# Patient Record
Sex: FEMALE | Race: WHITE | Employment: FULL TIME | URBAN - METROPOLITAN AREA
[De-identification: names, ages, dates, MRNs, and addresses within clinical notes are randomized per-mention and may not be internally consistent; named-entity substitution may affect disease eponyms.]

---

## 2017-01-20 ENCOUNTER — TELEPHONE (OUTPATIENT)
Dept: FAMILY MEDICINE CLINIC | Age: 57
End: 2017-01-20

## 2017-01-25 ENCOUNTER — TELEPHONE (OUTPATIENT)
Dept: FAMILY MEDICINE CLINIC | Age: 57
End: 2017-01-25

## 2017-01-31 ENCOUNTER — OFFICE VISIT (OUTPATIENT)
Age: 57
End: 2017-01-31

## 2017-01-31 ENCOUNTER — HOSPITAL ENCOUNTER (OUTPATIENT)
Dept: GENERAL RADIOLOGY | Age: 57
Discharge: HOME | End: 2017-01-31
Attending: INTERNAL MEDICINE | Admitting: INTERNAL MEDICINE

## 2017-01-31 VITALS
SYSTOLIC BLOOD PRESSURE: 120 MMHG | DIASTOLIC BLOOD PRESSURE: 58 MMHG | WEIGHT: 119 LBS | BODY MASS INDEX: 18.04 KG/M2 | HEIGHT: 68 IN

## 2017-01-31 DIAGNOSIS — E83.119 HEMOCHROMATOSIS, UNSPECIFIED HEMOCHROMATOSIS TYPE: ICD-10-CM

## 2017-01-31 DIAGNOSIS — M81.0 OSTEOPOROSIS, POSTMENOPAUSAL: ICD-10-CM

## 2017-01-31 DIAGNOSIS — M81.0 OSTEOPOROSIS, POSTMENOPAUSAL: Primary | ICD-10-CM

## 2017-01-31 DIAGNOSIS — Z51.81 MEDICATION MONITORING ENCOUNTER: ICD-10-CM

## 2017-01-31 DIAGNOSIS — R82.994 HYPERCALCIURIA: ICD-10-CM

## 2017-01-31 PROCEDURE — 77080 DXA BONE DENSITY AXIAL: CPT | Performed by: INTERNAL MEDICINE

## 2017-01-31 PROCEDURE — 99214 OFFICE O/P EST MOD 30 MIN: CPT | Performed by: INTERNAL MEDICINE

## 2017-01-31 RX ORDER — HYDROCHLOROTHIAZIDE 25 MG/1
TABLET ORAL
Qty: 90 TABLET | Refills: 4 | Status: SHIPPED | OUTPATIENT
Start: 2017-01-31 | End: 2017-08-04 | Stop reason: SDUPTHER

## 2017-01-31 RX ORDER — POTASSIUM CITRATE 5 MEQ/1
5 TABLET, EXTENDED RELEASE ORAL DAILY
COMMUNITY
End: 2019-07-09

## 2017-02-01 ENCOUNTER — PROCEDURE VISIT (OUTPATIENT)
Age: 57
End: 2017-02-01

## 2017-02-01 ENCOUNTER — OFFICE VISIT (OUTPATIENT)
Dept: FAMILY MEDICINE CLINIC | Age: 57
End: 2017-02-01

## 2017-02-01 VITALS
WEIGHT: 120 LBS | HEART RATE: 76 BPM | BODY MASS INDEX: 18.27 KG/M2 | DIASTOLIC BLOOD PRESSURE: 82 MMHG | SYSTOLIC BLOOD PRESSURE: 122 MMHG | OXYGEN SATURATION: 99 %

## 2017-02-01 DIAGNOSIS — M81.0 OSTEOPOROSIS, POSTMENOPAUSAL: Primary | ICD-10-CM

## 2017-02-01 DIAGNOSIS — M81.0 OSTEOPOROSIS, POSTMENOPAUSAL: ICD-10-CM

## 2017-02-01 DIAGNOSIS — I10 ESSENTIAL HYPERTENSION: ICD-10-CM

## 2017-02-01 DIAGNOSIS — Z76.89 ENCOUNTER TO ESTABLISH CARE: Primary | ICD-10-CM

## 2017-02-01 DIAGNOSIS — E83.119 HEMOCHROMATOSIS, UNSPECIFIED HEMOCHROMATOSIS TYPE: ICD-10-CM

## 2017-02-01 PROCEDURE — 99202 OFFICE O/P NEW SF 15 MIN: CPT | Performed by: FAMILY MEDICINE

## 2017-02-01 RX ORDER — MULTIVITAMIN WITH IRON
250 TABLET ORAL DAILY
COMMUNITY
End: 2020-08-03

## 2017-02-01 ASSESSMENT — PATIENT HEALTH QUESTIONNAIRE - PHQ9
SUM OF ALL RESPONSES TO PHQ9 QUESTIONS 1 & 2: 0
SUM OF ALL RESPONSES TO PHQ QUESTIONS 1-9: 0
1. LITTLE INTEREST OR PLEASURE IN DOING THINGS: 0
2. FEELING DOWN, DEPRESSED OR HOPELESS: 0

## 2017-03-06 ENCOUNTER — EMPLOYEE WELLNESS (OUTPATIENT)
Dept: OTHER | Age: 57
End: 2017-03-06

## 2017-03-09 ENCOUNTER — HOSPITAL ENCOUNTER (OUTPATIENT)
Dept: OTHER | Age: 57
Discharge: HOME | End: 2017-03-09
Attending: INTERNAL MEDICINE | Admitting: INTERNAL MEDICINE

## 2017-03-09 LAB
A/G RATIO: 1.9 (ref 1.1–2.2)
ALBUMIN SERPL-MCNC: 4.9 G/DL (ref 3.4–5)
ALP BLD-CCNC: 73 U/L (ref 40–129)
ALT SERPL-CCNC: 15 U/L (ref 10–40)
ANION GAP SERPL CALCULATED.3IONS-SCNC: 15 MMOL/L (ref 3–16)
AST SERPL-CCNC: 20 U/L (ref 15–37)
BASOPHILS ABSOLUTE: 0 K/UL (ref 0–0.2)
BASOPHILS RELATIVE PERCENT: 0.7 %
BILIRUB SERPL-MCNC: 0.4 MG/DL (ref 0–1)
BUN BLDV-MCNC: 14 MG/DL (ref 7–20)
CALCIUM SERPL-MCNC: 9.5 MG/DL (ref 8.3–10.6)
CHLORIDE BLD-SCNC: 95 MMOL/L (ref 99–110)
CO2: 28 MMOL/L (ref 21–32)
CREAT SERPL-MCNC: 0.6 MG/DL (ref 0.6–1.1)
EOSINOPHILS ABSOLUTE: 0.1 K/UL (ref 0–0.6)
EOSINOPHILS RELATIVE PERCENT: 1.3 %
FERRITIN: 18.5 NG/ML (ref 15–150)
GFR AFRICAN AMERICAN: >60
GFR NON-AFRICAN AMERICAN: >60
GLOBULIN: 2.6 G/DL
GLUCOSE BLD-MCNC: 55 MG/DL (ref 70–99)
HCT VFR BLD CALC: 39.9 % (ref 36–48)
HEMOGLOBIN: 13.4 G/DL (ref 12–16)
LYMPHOCYTES ABSOLUTE: 1.7 K/UL (ref 1–5.1)
LYMPHOCYTES RELATIVE PERCENT: 44.9 %
MCH RBC QN AUTO: 34.9 PG (ref 26–34)
MCHC RBC AUTO-ENTMCNC: 33.6 G/DL (ref 31–36)
MCV RBC AUTO: 103.7 FL (ref 80–100)
MONOCYTES ABSOLUTE: 0.5 K/UL (ref 0–1.3)
MONOCYTES RELATIVE PERCENT: 12.7 %
NEUTROPHILS ABSOLUTE: 1.5 K/UL (ref 1.7–7.7)
NEUTROPHILS RELATIVE PERCENT: 40.4 %
PDW BLD-RTO: 12.2 % (ref 12.4–15.4)
PLATELET # BLD: 189 K/UL (ref 135–450)
PMV BLD AUTO: 8.2 FL (ref 5–10.5)
POTASSIUM SERPL-SCNC: 3.4 MMOL/L (ref 3.5–5.1)
RBC # BLD: 3.84 M/UL (ref 4–5.2)
SODIUM BLD-SCNC: 138 MMOL/L (ref 136–145)
TOTAL PROTEIN: 7.5 G/DL (ref 6.4–8.2)
WBC # BLD: 3.8 K/UL (ref 4–11)

## 2017-03-10 LAB — AFP-TUMOR MARKER: 2 NG/ML (ref 0–9)

## 2017-07-20 ENCOUNTER — HOSPITAL ENCOUNTER (OUTPATIENT)
Dept: ENDOSCOPY | Age: 57
Discharge: HOME | End: 2017-07-20
Attending: INTERNAL MEDICINE | Admitting: INTERNAL MEDICINE

## 2017-07-20 VITALS
SYSTOLIC BLOOD PRESSURE: 126 MMHG | WEIGHT: 117 LBS | BODY MASS INDEX: 17.73 KG/M2 | DIASTOLIC BLOOD PRESSURE: 64 MMHG | HEART RATE: 80 BPM | HEIGHT: 68 IN | TEMPERATURE: 97.7 F | OXYGEN SATURATION: 100 %

## 2017-08-04 ENCOUNTER — TELEPHONE (OUTPATIENT)
Dept: ENDOCRINOLOGY | Age: 57
End: 2017-08-04

## 2017-08-04 RX ORDER — HYDROCHLOROTHIAZIDE 25 MG/1
TABLET ORAL
Qty: 90 TABLET | Refills: 1 | Status: SHIPPED | OUTPATIENT
Start: 2017-08-04 | End: 2018-02-05 | Stop reason: SDUPTHER

## 2017-08-07 ENCOUNTER — OFFICE VISIT (OUTPATIENT)
Dept: DERMATOLOGY | Age: 57
End: 2017-08-07

## 2017-08-07 DIAGNOSIS — L71.9 ROSACEA: ICD-10-CM

## 2017-08-07 DIAGNOSIS — Z12.83 SCREENING EXAM FOR SKIN CANCER: ICD-10-CM

## 2017-08-07 DIAGNOSIS — B35.1 ONYCHOMYCOSIS OF TOENAIL: ICD-10-CM

## 2017-08-07 DIAGNOSIS — D22.9 MULTIPLE BENIGN NEVI: Primary | ICD-10-CM

## 2017-08-07 PROCEDURE — 99203 OFFICE O/P NEW LOW 30 MIN: CPT | Performed by: DERMATOLOGY

## 2017-09-11 ENCOUNTER — HOSPITAL ENCOUNTER (OUTPATIENT)
Dept: OTHER | Age: 57
Discharge: HOME | End: 2017-09-11
Attending: INTERNAL MEDICINE | Admitting: INTERNAL MEDICINE

## 2017-09-11 LAB
A/G RATIO: 1.8 (ref 1.1–2.2)
ALBUMIN SERPL-MCNC: 4.8 G/DL (ref 3.4–5)
ALP BLD-CCNC: 69 U/L (ref 40–129)
ALT SERPL-CCNC: 15 U/L (ref 10–40)
ANION GAP SERPL CALCULATED.3IONS-SCNC: 14 MMOL/L (ref 3–16)
AST SERPL-CCNC: 19 U/L (ref 15–37)
BASOPHILS ABSOLUTE: 0 K/UL (ref 0–0.2)
BASOPHILS RELATIVE PERCENT: 1.2 %
BILIRUB SERPL-MCNC: 0.4 MG/DL (ref 0–1)
BUN BLDV-MCNC: 27 MG/DL (ref 7–20)
CALCIUM SERPL-MCNC: 9.9 MG/DL (ref 8.3–10.6)
CHLORIDE BLD-SCNC: 104 MMOL/L (ref 99–110)
CO2: 28 MMOL/L (ref 21–32)
CREAT SERPL-MCNC: <0.5 MG/DL (ref 0.6–1.1)
EOSINOPHILS ABSOLUTE: 0.1 K/UL (ref 0–0.6)
EOSINOPHILS RELATIVE PERCENT: 1.8 %
GFR AFRICAN AMERICAN: >60
GFR NON-AFRICAN AMERICAN: >60
GLOBULIN: 2.6 G/DL
GLUCOSE BLD-MCNC: 65 MG/DL (ref 70–99)
HCT VFR BLD CALC: 39.8 % (ref 36–48)
HEMOGLOBIN: 13.7 G/DL (ref 12–16)
INR BLD: 0.93 (ref 0.85–1.15)
LYMPHOCYTES ABSOLUTE: 0.9 K/UL (ref 1–5.1)
LYMPHOCYTES RELATIVE PERCENT: 31.8 %
MCH RBC QN AUTO: 35.3 PG (ref 26–34)
MCHC RBC AUTO-ENTMCNC: 34.4 G/DL (ref 31–36)
MCV RBC AUTO: 102.6 FL (ref 80–100)
MONOCYTES ABSOLUTE: 0.3 K/UL (ref 0–1.3)
MONOCYTES RELATIVE PERCENT: 9.1 %
NEUTROPHILS ABSOLUTE: 1.7 K/UL (ref 1.7–7.7)
NEUTROPHILS RELATIVE PERCENT: 56.1 %
PDW BLD-RTO: 12.7 % (ref 12.4–15.4)
PLATELET # BLD: 209 K/UL (ref 135–450)
PMV BLD AUTO: 8 FL (ref 5–10.5)
POTASSIUM SERPL-SCNC: 4.1 MMOL/L (ref 3.5–5.1)
PROTHROMBIN TIME: 10.5 SEC (ref 9.6–13)
RBC # BLD: 3.87 M/UL (ref 4–5.2)
SODIUM BLD-SCNC: 146 MMOL/L (ref 136–145)
TOTAL PROTEIN: 7.4 G/DL (ref 6.4–8.2)
WBC # BLD: 2.9 K/UL (ref 4–11)

## 2017-09-12 LAB — VITAMIN D 1,25-DIHYDROXY: 50.3 PG/ML (ref 19.9–79.3)

## 2017-09-13 LAB — AFP: 0.9 UG/L

## 2017-12-26 ENCOUNTER — HOSPITAL ENCOUNTER (OUTPATIENT)
Dept: MAMMOGRAPHY | Age: 57
Discharge: HOME | End: 2017-12-26
Attending: SPECIALIST | Admitting: SPECIALIST

## 2017-12-26 DIAGNOSIS — Z12.39 BREAST CANCER SCREENING: ICD-10-CM

## 2018-03-08 ENCOUNTER — EMPLOYEE WELLNESS (OUTPATIENT)
Dept: OTHER | Age: 58
End: 2018-03-08

## 2018-03-08 LAB
CHOLESTEROL, TOTAL: 185 MG/DL (ref 0–199)
GLUCOSE BLD-MCNC: 88 MG/DL (ref 70–99)
HDLC SERPL-MCNC: 98 MG/DL (ref 40–60)
LDL CHOLESTEROL CALCULATED: 75 MG/DL
TRIGL SERPL-MCNC: 59 MG/DL (ref 0–150)

## 2018-03-20 VITALS — WEIGHT: 120 LBS | BODY MASS INDEX: 18.27 KG/M2

## 2018-03-26 ENCOUNTER — TELEPHONE (OUTPATIENT)
Age: 58
End: 2018-03-26

## 2018-04-02 VITALS — WEIGHT: 115 LBS | BODY MASS INDEX: 17.49 KG/M2

## 2018-05-25 ENCOUNTER — HOSPITAL ENCOUNTER (OUTPATIENT)
Dept: GENERAL RADIOLOGY | Age: 58
Discharge: HOME | End: 2018-05-25
Attending: INTERNAL MEDICINE | Admitting: INTERNAL MEDICINE

## 2018-05-25 DIAGNOSIS — M81.0 AGE-RELATED OSTEOPOROSIS WITHOUT CURRENT PATHOLOGICAL FRACTURE: ICD-10-CM

## 2018-05-25 DIAGNOSIS — M81.0 OSTEOPOROSIS, POSTMENOPAUSAL: Chronic | ICD-10-CM

## 2018-05-25 PROCEDURE — 77080 DXA BONE DENSITY AXIAL: CPT | Performed by: INTERNAL MEDICINE

## 2018-05-29 ENCOUNTER — PROCEDURE VISIT (OUTPATIENT)
Age: 58
End: 2018-05-29

## 2018-05-29 DIAGNOSIS — M81.0 OSTEOPOROSIS, POSTMENOPAUSAL: Primary | Chronic | ICD-10-CM

## 2018-07-03 ENCOUNTER — OFFICE VISIT (OUTPATIENT)
Age: 58
End: 2018-07-03

## 2018-07-03 VITALS
WEIGHT: 114 LBS | DIASTOLIC BLOOD PRESSURE: 62 MMHG | SYSTOLIC BLOOD PRESSURE: 118 MMHG | BODY MASS INDEX: 17.28 KG/M2 | HEIGHT: 68 IN

## 2018-07-03 DIAGNOSIS — M81.0 OSTEOPOROSIS, POSTMENOPAUSAL: Primary | Chronic | ICD-10-CM

## 2018-07-03 DIAGNOSIS — R82.994 HYPERCALCIURIA: Chronic | ICD-10-CM

## 2018-07-03 DIAGNOSIS — Z51.81 MEDICATION MONITORING ENCOUNTER: Chronic | ICD-10-CM

## 2018-07-03 PROCEDURE — 99214 OFFICE O/P EST MOD 30 MIN: CPT | Performed by: INTERNAL MEDICINE

## 2018-07-03 RX ORDER — HYDROCHLOROTHIAZIDE 25 MG/1
TABLET ORAL
Qty: 90 TABLET | Refills: 4 | Status: SHIPPED | OUTPATIENT
Start: 2018-07-03 | End: 2019-07-09 | Stop reason: SDUPTHER

## 2018-07-03 RX ORDER — DIMENHYDRINATE 50 MG
TABLET ORAL
COMMUNITY

## 2018-07-03 NOTE — PROGRESS NOTES
Subjective:      Patient ID: Omar Chopra is a 61 y.o. female.     HPI    Review of Systems    Objective:   Physical Exam    Assessment:            Plan:

## 2018-07-03 NOTE — PROGRESS NOTES
using arms. No apparent focal motor or sensory deficit. Coordination appears normal.     BONE DENSITY. Most recent done here using Hologic equipment. T-scores  Initial study: 10/11/2012 L1-L4 -2.2 left fem. neck -2.6   Current study: 05/25/2018 L1-L4 -2.5 left fem. neck -2.9     The table below shows bone mineral density (grams/cm2), the appropriate measure for comparing serial scans. A significant increase or decrease is based on precision studies done at our center according to the ISCD protocol with a least significant change of 0.030 g/cm2. PA spine Proximal Femur (left)   Date L1-L4 Fem. neck Trochanter Total hip   10/11/2012 0.800 0.560 0.561 ---   01/19/2016 0.768 0.524 0.525 ---   01/31/2017 0.768 0.567 0.503 0.692   05/25/2018 0.766 0.524 0.526 0.697     IMPRESSION:  BONE DENSITY IS LOW, CONSISTENT WITH OSTEOPOROSIS. BETWEEN 2017 AND 2018, BMD DID NOT CHANGE SIGNIFICANTLY IN THE SPINE, TROCHANTER OR TOTAL HIP. BMD MAY HAVE DECREASED IN THE FEMORAL NECK BUT IS SIMILAR NOW TO 2016. Labs: 11/2012, CBC, TSH, CMP. 08/2015, Ca 9.7, Cr 0.66. 02/2016 . 03/2016 Ca 9.3 Cr 0.68.  08/2016 Ca 9.4 Cr 0.5 PTH 31, 1, 25 D 62 (20-79). 09/2016 Ca 9.1 Cr 0.7 K 3.4. 09/2017 Ca 9.9 Cr 0.5. X-rays viewed: DXA printouts reviewed. 12/2013 abd CT.    ASSESSMENT. Osteoporosis, bone density lower than desirable. BMD decreased substantially 9454-5936 but was stable/increased 3366-3600. Using FRAX, 10-year risk is below intervention thresholds now and should remain so for 5-10 years. 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 PLANS. Continue HCTZ 25 mg/d. Return appointment with DXA in 1 year. I spent 25 minutes face to face with this patient. Over 50% of that time was spent on counseling and care coordination. See assessment and plan for counseling and care coordination details. Tarun Beal MD, Director, [facility]) Osteoporosis and Bone Health Services    CC: Gisele French MD

## 2018-08-13 ENCOUNTER — OFFICE VISIT (OUTPATIENT)
Dept: DERMATOLOGY | Age: 58
End: 2018-08-13

## 2018-08-13 DIAGNOSIS — L71.9 ROSACEA: ICD-10-CM

## 2018-08-13 DIAGNOSIS — Z12.83 SCREENING EXAM FOR SKIN CANCER: ICD-10-CM

## 2018-08-13 DIAGNOSIS — D22.9 MULTIPLE BENIGN NEVI: Primary | ICD-10-CM

## 2018-08-13 PROCEDURE — 99213 OFFICE O/P EST LOW 20 MIN: CPT | Performed by: DERMATOLOGY

## 2018-08-13 NOTE — PROGRESS NOTES
[facility]) Dermatology  Karen Stern MD  [phone]      [facility]  1960    60 y.o. female     Date of Visit: 8/13/2018    Last Visit: 1yr    Chief Complaint: Skin check    History of Present Illness:  1. Here for skin/mole check. No new moles. No moles changing in size, shape, color. No moles associated w/ pain, bleeding, pruritus.   -[facility] Uses SPF 15 sunscreen (increased from SPF 4)     2. F/u PP rosacea, primarily affecting L cheek. Improved w/ prn use of metrocream (every few weeks)    Derm History:   Onychomycosis of L 1st toenail - jublia     Review of Systems:  Constitutional: Reports general sense of well-being. Skin: No interval severe sunburns. Allergies: Reviewed and updated. Past Medical History, Surgical History, Medications and Allergies reviewed. Past Medical History:   Diagnosis Date    Hypertension     Osteopenia      Past Surgical History:   Procedure Laterality Date    BONE MARROW BIOPSY      COLONOSCOPY      HEMORRHOID SURGERY      LIVER BIOPSY         No Known Allergies  Outpatient Prescriptions Marked as Taking for the 8/13/18 encounter (Office Visit) with Karen Stern MD   Medication Sig Dispense Refill    Coenzyme Q10 (CO Q-10) 100 MG CAPS Take by mouth      hydrochlorothiazide (HYDRODIURIL) 25 MG tablet TAKE 1 TABLET BY MOUTH EVERY MORNING 90 tablet 4    Efinaconazole (JUBLIA) 10 % SOLN Apply to multiple affected nails daily for 48 weeks. 8 mL 11    Biotin 2.5 MG CAPS Take by mouth      metroNIDAZOLE (METROCREAM) 0.75 % cream Apply to face bid.  45 g 3    magnesium (MAGNESIUM-OXIDE) 250 MG TABS tablet Take 250 mg by mouth daily      potassium citrate (UROCIT-K) 5 MEQ (540 MG) extended release tablet Take 5 mEq by mouth daily      Calcium Carbonate-Vit D-Min (CALTRATE PLUS PO) Take by mouth      Cholecalciferol (VITAMIN D) 2000 UNITS CAPS capsule Take by mouth      folic acid (FOLVITE) 594 MCG tablet Take 800 mcg by mouth

## 2018-09-05 ENCOUNTER — HOSPITAL ENCOUNTER (OUTPATIENT)
Dept: OTHER | Age: 58
Discharge: HOME | End: 2018-09-05
Attending: INTERNAL MEDICINE | Admitting: INTERNAL MEDICINE

## 2018-09-05 ENCOUNTER — HOSPITAL ENCOUNTER (OUTPATIENT)
Dept: OTHER | Age: 58
Discharge: HOME | End: 2018-09-05

## 2018-09-05 LAB
ALBUMIN SERPL-MCNC: 4.5 G/DL (ref 3.4–5)
ALBUMIN SERPL-MCNC: 4.5 G/DL (ref 3.4–5)
ALP BLD-CCNC: 71 U/L (ref 40–129)
ALT SERPL-CCNC: 16 U/L (ref 10–40)
ANION GAP SERPL CALCULATED.3IONS-SCNC: 13 MMOL/L (ref 3–16)
AST SERPL-CCNC: 23 U/L (ref 15–37)
BASOPHILS ABSOLUTE: 0 K/UL (ref 0–0.2)
BASOPHILS RELATIVE PERCENT: 1 %
BILIRUB SERPL-MCNC: <0.2 MG/DL (ref 0–1)
BILIRUBIN DIRECT: <0.2 MG/DL (ref 0–0.3)
BILIRUBIN, INDIRECT: NORMAL MG/DL (ref 0–1)
BUN BLDV-MCNC: 18 MG/DL (ref 7–20)
CALCIUM SERPL-MCNC: 9.5 MG/DL (ref 8.3–10.6)
CHLORIDE BLD-SCNC: 101 MMOL/L (ref 99–110)
CO2: 28 MMOL/L (ref 21–32)
CREAT SERPL-MCNC: 0.6 MG/DL (ref 0.6–1.1)
EOSINOPHILS ABSOLUTE: 0.1 K/UL (ref 0–0.6)
EOSINOPHILS RELATIVE PERCENT: 1.8 %
FERRITIN: 16.6 NG/ML (ref 15–150)
GFR AFRICAN AMERICAN: >60
GFR NON-AFRICAN AMERICAN: >60
GLUCOSE BLD-MCNC: 84 MG/DL (ref 70–99)
HCT VFR BLD CALC: 36.6 % (ref 36–48)
HEMOGLOBIN: 12.4 G/DL (ref 12–16)
LYMPHOCYTES ABSOLUTE: 0.9 K/UL (ref 1–5.1)
LYMPHOCYTES RELATIVE PERCENT: 27.2 %
MCH RBC QN AUTO: 34.1 PG (ref 26–34)
MCHC RBC AUTO-ENTMCNC: 33.9 G/DL (ref 31–36)
MCV RBC AUTO: 100.5 FL (ref 80–100)
MONOCYTES ABSOLUTE: 0.3 K/UL (ref 0–1.3)
MONOCYTES RELATIVE PERCENT: 9.6 %
NEUTROPHILS ABSOLUTE: 2 K/UL (ref 1.7–7.7)
NEUTROPHILS RELATIVE PERCENT: 60.4 %
PDW BLD-RTO: 14.6 % (ref 12.4–15.4)
PHOSPHORUS: 5.5 MG/DL (ref 2.5–4.9)
PLATELET # BLD: 193 K/UL (ref 135–450)
PMV BLD AUTO: 8.3 FL (ref 5–10.5)
POTASSIUM SERPL-SCNC: 4.2 MMOL/L (ref 3.5–5.1)
RBC # BLD: 3.64 M/UL (ref 4–5.2)
SODIUM BLD-SCNC: 142 MMOL/L (ref 136–145)
TOTAL PROTEIN: 6.6 G/DL (ref 6.4–8.2)
WBC # BLD: 3.3 K/UL (ref 4–11)

## 2018-12-31 ENCOUNTER — HOSPITAL ENCOUNTER (OUTPATIENT)
Dept: WOMENS IMAGING | Age: 58
Discharge: HOME | End: 2018-12-31
Payer: COMMERCIAL

## 2018-12-31 DIAGNOSIS — Z12.31 ENCOUNTER FOR SCREENING MAMMOGRAM FOR BREAST CANCER: ICD-10-CM

## 2018-12-31 PROCEDURE — 77063 BREAST TOMOSYNTHESIS BI: CPT

## 2019-03-18 ENCOUNTER — HOSPITAL ENCOUNTER (OUTPATIENT)
Age: 59
Discharge: HOME | End: 2019-03-18
Payer: COMMERCIAL

## 2019-03-18 LAB
ALBUMIN SERPL-MCNC: 4.8 G/DL (ref 3.4–5)
ALP BLD-CCNC: 65 U/L (ref 40–129)
ALT SERPL-CCNC: 13 U/L (ref 10–40)
ANION GAP SERPL CALCULATED.3IONS-SCNC: 14 MMOL/L (ref 3–16)
AST SERPL-CCNC: 18 U/L (ref 15–37)
BASOPHILS ABSOLUTE: 0 K/UL (ref 0–0.2)
BASOPHILS RELATIVE PERCENT: 1 %
BILIRUB SERPL-MCNC: 0.5 MG/DL (ref 0–1)
BILIRUBIN DIRECT: <0.2 MG/DL (ref 0–0.3)
BILIRUBIN URINE: NEGATIVE
BILIRUBIN, INDIRECT: NORMAL MG/DL (ref 0–1)
BLOOD, URINE: NEGATIVE
BUN BLDV-MCNC: 21 MG/DL (ref 7–20)
CALCIUM SERPL-MCNC: 9.7 MG/DL (ref 8.3–10.6)
CHLORIDE BLD-SCNC: 99 MMOL/L (ref 99–110)
CHOLESTEROL, TOTAL: 197 MG/DL (ref 0–199)
CLARITY: CLEAR
CO2: 30 MMOL/L (ref 21–32)
COLOR: YELLOW
CREAT SERPL-MCNC: 0.6 MG/DL (ref 0.6–1.1)
EOSINOPHILS ABSOLUTE: 0 K/UL (ref 0–0.6)
EOSINOPHILS RELATIVE PERCENT: 1 %
FERRITIN: 25.3 NG/ML (ref 15–150)
GFR AFRICAN AMERICAN: >60
GFR NON-AFRICAN AMERICAN: >60
GLUCOSE BLD-MCNC: 90 MG/DL (ref 70–99)
GLUCOSE URINE: NEGATIVE MG/DL
HCT VFR BLD CALC: 35.7 % (ref 36–48)
HDLC SERPL-MCNC: 84 MG/DL (ref 40–60)
HEMOGLOBIN: 12.1 G/DL (ref 12–16)
IRON SATURATION: 61 % (ref 15–50)
IRON: 182 UG/DL (ref 37–145)
KETONES, URINE: NEGATIVE MG/DL
LDL CHOLESTEROL CALCULATED: 102 MG/DL
LEUKOCYTE ESTERASE, URINE: NEGATIVE
LYMPHOCYTES ABSOLUTE: 0.5 K/UL (ref 1–5.1)
LYMPHOCYTES RELATIVE PERCENT: 15 %
MACROCYTES: ABNORMAL
MCH RBC QN AUTO: 34 PG (ref 26–34)
MCHC RBC AUTO-ENTMCNC: 33.9 G/DL (ref 31–36)
MCV RBC AUTO: 100.2 FL (ref 80–100)
MICROSCOPIC EXAMINATION: NORMAL
MONOCYTES ABSOLUTE: 0.3 K/UL (ref 0–1.3)
MONOCYTES RELATIVE PERCENT: 10 %
NEUTROPHILS ABSOLUTE: 2.4 K/UL (ref 1.7–7.7)
NEUTROPHILS RELATIVE PERCENT: 73 %
NITRITE, URINE: NEGATIVE
PDW BLD-RTO: 12.3 % (ref 12.4–15.4)
PH UA: 7 (ref 5–8)
PHOSPHORUS: 4.4 MG/DL (ref 2.5–4.9)
PLATELET # BLD: 312 K/UL (ref 135–450)
PLATELET SLIDE REVIEW: ADEQUATE
PMV BLD AUTO: 7.6 FL (ref 5–10.5)
POTASSIUM SERPL-SCNC: 4.2 MMOL/L (ref 3.5–5.1)
PROTEIN UA: NEGATIVE MG/DL
RBC # BLD: 3.56 M/UL (ref 4–5.2)
RBC # BLD: NORMAL 10*6/UL
SLIDE REVIEW: ABNORMAL
SODIUM BLD-SCNC: 143 MMOL/L (ref 136–145)
SPECIFIC GRAVITY UA: 1.01 (ref 1–1.03)
TOTAL IRON BINDING CAPACITY: 297 UG/DL (ref 260–445)
TOTAL PROTEIN: 7.4 G/DL (ref 6.4–8.2)
TRIGL SERPL-MCNC: 54 MG/DL (ref 0–150)
TSH SERPL DL<=0.05 MIU/L-ACNC: 1.45 UIU/ML (ref 0.27–4.2)
URINE REFLEX TO CULTURE: NORMAL
URINE TYPE: NORMAL
UROBILINOGEN, URINE: 0.2 E.U./DL
VITAMIN D 25-HYDROXY: 60.8 NG/ML
VLDLC SERPL CALC-MCNC: 11 MG/DL
WBC # BLD: 3.3 K/UL (ref 4–11)

## 2019-03-18 PROCEDURE — 82306 VITAMIN D 25 HYDROXY: CPT

## 2019-03-18 PROCEDURE — 81003 URINALYSIS AUTO W/O SCOPE: CPT

## 2019-03-18 PROCEDURE — 84443 ASSAY THYROID STIM HORMONE: CPT

## 2019-03-18 PROCEDURE — 36415 COLL VENOUS BLD VENIPUNCTURE: CPT

## 2019-03-18 PROCEDURE — 85027 COMPLETE CBC AUTOMATED: CPT

## 2019-03-18 PROCEDURE — 83540 ASSAY OF IRON: CPT

## 2019-03-18 PROCEDURE — 82728 ASSAY OF FERRITIN: CPT

## 2019-03-18 PROCEDURE — 85007 BL SMEAR W/DIFF WBC COUNT: CPT

## 2019-03-18 PROCEDURE — 80076 HEPATIC FUNCTION PANEL: CPT

## 2019-03-18 PROCEDURE — 80061 LIPID PANEL: CPT

## 2019-03-18 PROCEDURE — 83550 IRON BINDING TEST: CPT

## 2019-03-18 PROCEDURE — 84100 ASSAY OF PHOSPHORUS: CPT

## 2019-03-18 PROCEDURE — 80048 BASIC METABOLIC PNL TOTAL CA: CPT

## 2019-07-09 ENCOUNTER — PROCEDURE VISIT (OUTPATIENT)
Dept: ENDOCRINOLOGY | Age: 59
End: 2019-07-09
Payer: COMMERCIAL

## 2019-07-09 ENCOUNTER — OFFICE VISIT (OUTPATIENT)
Dept: ENDOCRINOLOGY | Age: 59
End: 2019-07-09
Payer: COMMERCIAL

## 2019-07-09 ENCOUNTER — HOSPITAL ENCOUNTER (OUTPATIENT)
Dept: GENERAL RADIOLOGY | Age: 59
Discharge: HOME | End: 2019-07-09
Payer: COMMERCIAL

## 2019-07-09 VITALS
DIASTOLIC BLOOD PRESSURE: 60 MMHG | SYSTOLIC BLOOD PRESSURE: 110 MMHG | WEIGHT: 118.6 LBS | BODY MASS INDEX: 17.98 KG/M2 | HEIGHT: 68 IN

## 2019-07-09 DIAGNOSIS — R82.994 HYPERCALCIURIA: ICD-10-CM

## 2019-07-09 DIAGNOSIS — M81.0 OSTEOPOROSIS, POSTMENOPAUSAL: Primary | ICD-10-CM

## 2019-07-09 DIAGNOSIS — M81.0 OSTEOPOROSIS, POSTMENOPAUSAL: Chronic | ICD-10-CM

## 2019-07-09 DIAGNOSIS — Z51.81 MEDICATION MONITORING ENCOUNTER: ICD-10-CM

## 2019-07-09 DIAGNOSIS — M81.0 OSTEOPOROSIS, POSTMENOPAUSAL: ICD-10-CM

## 2019-07-09 PROCEDURE — 77080 DXA BONE DENSITY AXIAL: CPT | Performed by: INTERNAL MEDICINE

## 2019-07-09 PROCEDURE — 99214 OFFICE O/P EST MOD 30 MIN: CPT | Performed by: INTERNAL MEDICINE

## 2019-07-09 PROCEDURE — 77080 DXA BONE DENSITY AXIAL: CPT

## 2019-07-09 RX ORDER — HYDROCHLOROTHIAZIDE 25 MG/1
TABLET ORAL
Qty: 90 TABLET | Refills: 4 | Status: SHIPPED | OUTPATIENT
Start: 2019-07-09 | End: 2020-03-01 | Stop reason: SDUPTHER

## 2019-07-24 ENCOUNTER — TELEPHONE (OUTPATIENT)
Dept: ENDOCRINOLOGY | Age: 59
End: 2019-07-24

## 2019-10-18 ENCOUNTER — HOSPITAL ENCOUNTER (OUTPATIENT)
Age: 59
Discharge: HOME | End: 2019-10-18
Payer: COMMERCIAL

## 2019-10-18 ENCOUNTER — HOSPITAL ENCOUNTER (OUTPATIENT)
Dept: ULTRASOUND IMAGING | Age: 59
Discharge: HOME | End: 2019-10-18
Payer: COMMERCIAL

## 2019-10-18 DIAGNOSIS — E83.119 HEMOCHROMATOSIS, UNSPECIFIED HEMOCHROMATOSIS TYPE: ICD-10-CM

## 2019-10-18 LAB
ALBUMIN SERPL-MCNC: 4.7 G/DL (ref 3.4–5)
ALP BLD-CCNC: 73 U/L (ref 40–129)
ALT SERPL-CCNC: 13 U/L (ref 10–40)
AST SERPL-CCNC: 19 U/L (ref 15–37)
BASOPHILS ABSOLUTE: 0 K/UL (ref 0–0.2)
BASOPHILS RELATIVE PERCENT: 1 %
BILIRUB SERPL-MCNC: 0.4 MG/DL (ref 0–1)
BILIRUBIN DIRECT: <0.2 MG/DL (ref 0–0.3)
BILIRUBIN, INDIRECT: NORMAL MG/DL (ref 0–1)
EOSINOPHILS ABSOLUTE: 0.1 K/UL (ref 0–0.6)
EOSINOPHILS RELATIVE PERCENT: 2.5 %
FERRITIN: 11.7 NG/ML (ref 15–150)
HCT VFR BLD CALC: 34.9 % (ref 36–48)
HEMOGLOBIN: 12 G/DL (ref 12–16)
LYMPHOCYTES ABSOLUTE: 0.8 K/UL (ref 1–5.1)
LYMPHOCYTES RELATIVE PERCENT: 27.3 %
MCH RBC QN AUTO: 32.6 PG (ref 26–34)
MCHC RBC AUTO-ENTMCNC: 34.5 G/DL (ref 31–36)
MCV RBC AUTO: 94.4 FL (ref 80–100)
MONOCYTES ABSOLUTE: 0.3 K/UL (ref 0–1.3)
MONOCYTES RELATIVE PERCENT: 9.7 %
NEUTROPHILS ABSOLUTE: 1.7 K/UL (ref 1.7–7.7)
NEUTROPHILS RELATIVE PERCENT: 59.5 %
PDW BLD-RTO: 13.4 % (ref 12.4–15.4)
PLATELET # BLD: 240 K/UL (ref 135–450)
PMV BLD AUTO: 8.2 FL (ref 5–10.5)
RBC # BLD: 3.7 M/UL (ref 4–5.2)
TOTAL PROTEIN: 7.3 G/DL (ref 6.4–8.2)
WBC # BLD: 2.9 K/UL (ref 4–11)

## 2019-10-18 PROCEDURE — 85025 COMPLETE CBC W/AUTO DIFF WBC: CPT

## 2019-10-18 PROCEDURE — 80076 HEPATIC FUNCTION PANEL: CPT

## 2019-10-18 PROCEDURE — 36415 COLL VENOUS BLD VENIPUNCTURE: CPT

## 2019-10-18 PROCEDURE — 82728 ASSAY OF FERRITIN: CPT

## 2019-10-18 PROCEDURE — 76705 ECHO EXAM OF ABDOMEN: CPT

## 2019-12-20 LAB
BASOPHILS ABSOLUTE: 0 K/UL (ref 0–0.2)
BASOPHILS RELATIVE PERCENT: 0.8 %
EOSINOPHILS ABSOLUTE: 0 K/UL (ref 0–0.6)
EOSINOPHILS RELATIVE PERCENT: 1.8 %
FERRITIN: 9.8 NG/ML (ref 15–150)
HCT VFR BLD CALC: 36 % (ref 36–48)
HEMOGLOBIN: 12.2 G/DL (ref 12–16)
LYMPHOCYTES ABSOLUTE: 0.9 K/UL (ref 1–5.1)
LYMPHOCYTES RELATIVE PERCENT: 32.7 %
MCH RBC QN AUTO: 32.7 PG (ref 26–34)
MCHC RBC AUTO-ENTMCNC: 33.8 G/DL (ref 31–36)
MCV RBC AUTO: 96.8 FL (ref 80–100)
MONOCYTES ABSOLUTE: 0.3 K/UL (ref 0–1.3)
MONOCYTES RELATIVE PERCENT: 12.2 %
NEUTROPHILS ABSOLUTE: 1.5 K/UL (ref 1.7–7.7)
NEUTROPHILS RELATIVE PERCENT: 52.5 %
PDW BLD-RTO: 15.2 % (ref 12.4–15.4)
PLATELET # BLD: 189 K/UL (ref 135–450)
PMV BLD AUTO: 8.2 FL (ref 5–10.5)
RBC # BLD: 3.72 M/UL (ref 4–5.2)
WBC # BLD: 2.8 K/UL (ref 4–11)

## 2019-12-31 ENCOUNTER — HOSPITAL ENCOUNTER (OUTPATIENT)
Dept: WOMENS IMAGING | Age: 59
Discharge: HOME | End: 2019-12-31
Payer: COMMERCIAL

## 2019-12-31 PROCEDURE — 77067 SCR MAMMO BI INCL CAD: CPT

## 2020-01-20 ENCOUNTER — OFFICE VISIT (OUTPATIENT)
Dept: DERMATOLOGY | Age: 60
End: 2020-01-20
Payer: COMMERCIAL

## 2020-01-20 PROCEDURE — 99213 OFFICE O/P EST LOW 20 MIN: CPT | Performed by: DERMATOLOGY

## 2020-07-02 ENCOUNTER — OFFICE VISIT (OUTPATIENT)
Dept: PRIMARY CARE CLINIC | Age: 60
End: 2020-07-02
Payer: COMMERCIAL

## 2020-07-02 PROCEDURE — 99211 OFF/OP EST MAY X REQ PHY/QHP: CPT | Performed by: NURSE PRACTITIONER

## 2020-07-02 NOTE — PATIENT INSTRUCTIONS
Steps to help prevent the spread of COVID-19 if you are sick  SOURCE - https://preston-jordan.info/. html     Stay home except to get medical care   ; Stay home: People who are mildly ill with COVID-19 are able to isolate at home during their illness. You should restrict activities outside your home, except for getting medical care.   ; Avoid public areas: Do not go to work, school, or public areas.   ; Avoid public transportation: Avoid using public transportation, ride-sharing, or taxis.  ; Separate yourself from other people and animals in your home   ; Stay away from others: As much as possible, you should stay in a specific room and away from other people in your home. Also, you should use a separate bathroom, if available.   ; Limit contact with pets & animals: You should restrict contact with pets and other animals while you are sick with COVID-19, just like you would around other people. Although there have not been reports of pets or other animals becoming sick with COVID-19, it is still recommended that people sick with COVID-19 limit contact with animals until more information is known about the virus. ; When possible, have another member of your household care for your animals while you are sick. If you are sick with COVID-19, avoid contact with your pet, including petting, snuggling, being kissed or licked, and sharing food. If you must care for your pet or be around animals while you are sick, wash your hands before and after you interact with pets and wear a facemask. See COVID-19 and Animals for more information. Other considerations   The ill person should eat/be fed in their room if possible. Non-disposable  items used should be handled with gloves and washed with hot water or in a . Clean hands after handling used  items.  If possible, dedicate a lined trash can for the ill person.  Use gloves when removing garbage bags, handling, and disposing of trash. Wash hands after handling or disposing of trash.  Consider consulting with your local health department about trash disposal guidance if available. Information for Household Members and Caregivers of Someone who is Sick   Call ahead before visiting your doctor   Call ahead: If you have a medical appointment, call the healthcare provider and tell them that you have or may have COVID-19. This will help the healthcare provider's office take steps to keep other people from getting infected or exposed. Wear a facemask if you are sick   ; If you are sick: You should wear a facemask when you are around other people (e.g., sharing a room or vehicle) or pets and before you enter a healthcare provider's office. ; If you are caring for others: If the person who is sick is not able to wear a facemask (for example, because it causes trouble breathing), then people who live with the person who is sick should not stay in the same room with them, or they should wear a facemask if they enter a room with the person who is sick. Cover your coughs and sneezes   ; Cover: Cover your mouth and nose with a tissue when you cough or sneeze.   ; Dispose: Throw used tissues in a lined trash can.   ; Wash hands: Immediately wash your hands with soap and water for at least 20 seconds or, if soap and water are not available, clean your hands with an alcohol-based hand  that contains at least 60% alcohol. Clean your hands often   ;  Wash hands: Wash your hands often with soap and water for at least 20 seconds, especially after blowing your nose, coughing, or sneezing; going to the bathroom; and before eating or preparing food.   ; Hand : If soap and water are not readily available, use an alcohol-based hand  with at least 60% alcohol, covering all surfaces of your hands and rubbing them together until they feel dry.   ; Soap and water: Soap and water are the best option if hands are visibly dirty.   ; Avoid touching: Avoid touching your eyes, nose, and mouth with unwashed hands. Handwashing Tips   ; Wet your hands with clean, running water (warm or cold), turn off the tap, and apply soap.  ; Lather your hands by rubbing them together with the soap. Lather the backs of your hands, between your fingers, and under your nails. ; Scrub your hands for at least 20 seconds. Need a timer? Hum the [place] from beginning to end twice.  ; Rinse your hands well under clean, running water.  ; Dry your hands using a clean towel or air dry them. Avoid sharing personal household items   ; Do not share: You should not share dishes, drinking glasses, cups, eating utensils, towels, or bedding with other people or pets in your home.   ; Wash thoroughly after use: After using these items, they should be washed thoroughly with soap and water. Clean all high-touch surfaces everyday   ; Clean and disinfect: Practice routine cleaning of high touch surfaces.  ; High touch surfaces include counters, tabletops, doorknobs, bathroom fixtures, toilets, phones, keyboards, tablets, and bedside tables.  ; Disinfect areas with bodily fluids: Also, clean any surfaces that may have blood, stool, or body fluids on them.   ; Household : Use a household cleaning spray or wipe, according to the label instructions. Labels contain instructions for safe and effective use of the cleaning product including precautions you should take when applying the product, such as wearing gloves and making sure you have good ventilation during use of the product.     Monitor your symptoms   Seek medical attention: Seek prompt medical attention if your illness is worsening     (e.g., difficulty breathing).   ; Call your doctor: Before seeking care, call your healthcare provider and tell them that you have, or are being evaluated for, COVID-19.   ; Wear a facemask when sick: Put on a facemask before you enter the facility. These steps will help the healthcare provider's office to keep other people in the office or waiting room from getting infected or exposed. ; Alert health department: Ask your healthcare provider to call the local or state health department. Persons who are placed under active monitoring or facilitated self-monitoring should follow instructions provided by their local health department or occupational health professionals, as appropriate.  ; Call 911 if you have a medical emergency: If you have a medical emergency and need to call 911, notify the dispatch personnel that you have, or are being evaluated for COVID-19. If possible, put on a facemask before emergency medical services arrive.

## 2020-07-02 NOTE — PROGRESS NOTES
Eddi Coronado received a viral test for COVID-19. They were educated on isolation and quarantine as appropriate. For any symptoms, they were directed to seek care from their PCP, given contact information to establish with a doctor, directed to an urgent care or the emergency room.

## 2020-07-05 LAB
SARS-COV-2: NOT DETECTED
SOURCE: NORMAL

## 2020-07-17 LAB
BASOPHILS ABSOLUTE: 0 K/UL (ref 0–0.2)
BASOPHILS RELATIVE PERCENT: 1.2 %
EOSINOPHILS ABSOLUTE: 0 K/UL (ref 0–0.6)
EOSINOPHILS RELATIVE PERCENT: 1.7 %
FERRITIN: 12.7 NG/ML (ref 15–150)
HCT VFR BLD CALC: 38.5 % (ref 36–48)
HEMOGLOBIN: 13 G/DL (ref 12–16)
LYMPHOCYTES ABSOLUTE: 0.8 K/UL (ref 1–5.1)
LYMPHOCYTES RELATIVE PERCENT: 27.3 %
MCH RBC QN AUTO: 34 PG (ref 26–34)
MCHC RBC AUTO-ENTMCNC: 33.8 G/DL (ref 31–36)
MCV RBC AUTO: 100.7 FL (ref 80–100)
MONOCYTES ABSOLUTE: 0.3 K/UL (ref 0–1.3)
MONOCYTES RELATIVE PERCENT: 11.5 %
NEUTROPHILS ABSOLUTE: 1.7 K/UL (ref 1.7–7.7)
NEUTROPHILS RELATIVE PERCENT: 58.3 %
PDW BLD-RTO: 12.9 % (ref 12.4–15.4)
PLATELET # BLD: 202 K/UL (ref 135–450)
PMV BLD AUTO: 8.2 FL (ref 5–10.5)
RBC # BLD: 3.83 M/UL (ref 4–5.2)
WBC # BLD: 2.9 K/UL (ref 4–11)

## 2020-08-03 ENCOUNTER — OFFICE VISIT (OUTPATIENT)
Dept: ENDOCRINOLOGY | Age: 60
End: 2020-08-03
Payer: COMMERCIAL

## 2020-08-03 ENCOUNTER — PROCEDURE VISIT (OUTPATIENT)
Dept: ENDOCRINOLOGY | Age: 60
End: 2020-08-03

## 2020-08-03 ENCOUNTER — HOSPITAL ENCOUNTER (OUTPATIENT)
Dept: GENERAL RADIOLOGY | Age: 60
Discharge: HOME | End: 2020-08-03
Payer: COMMERCIAL

## 2020-08-03 VITALS
SYSTOLIC BLOOD PRESSURE: 114 MMHG | BODY MASS INDEX: 17.76 KG/M2 | WEIGHT: 117.2 LBS | DIASTOLIC BLOOD PRESSURE: 64 MMHG | HEIGHT: 68 IN

## 2020-08-03 PROCEDURE — 77080 DXA BONE DENSITY AXIAL: CPT

## 2020-08-03 PROCEDURE — 99215 OFFICE O/P EST HI 40 MIN: CPT | Performed by: INTERNAL MEDICINE

## 2020-08-03 PROCEDURE — 77080 DXA BONE DENSITY AXIAL: CPT | Performed by: INTERNAL MEDICINE

## 2020-08-03 RX ORDER — ALENDRONATE SODIUM 70 MG/1
70 TABLET ORAL WEEKLY
Qty: 12 TABLET | Refills: 4 | Status: SHIPPED | OUTPATIENT
Start: 2020-08-03 | End: 2021-09-14 | Stop reason: SDUPTHER

## 2020-08-03 NOTE — PROGRESS NOTES
[address]  Osteoporosis and [address]  [address], [address]  Phone [phone]  Fax [phone]    NAME:  Lissette Dickson  : 1960  CONSULT DATE: 2013  MOST RECENT VISIT: 2019  TODAYS DATE: 2020    Labs @ [facility] 2020    PROBLEMS. Osteoporosis by DXA 10/2012, lowest T-score -2.6 in the left femoral neck  Natural menopause age ~53 (~)  Hemochromatosis diagnosed   Hypertension  Hypercalciuria, normal 24-h urine calcium for her is 100-220 mg/d    293 mg/d 2016 on no treatment    296 mg/d 2016 with HCTZ 12.5 mg/d    136 mg/d 2016 with HCTZ 25 mg/d  Hypokalemia, 2016 K 3.4    CURRENT MANAGEMENT FOR OSTEOPOROSIS. Calcium, diet 550 mg/d , supplements cause constipation    250 mg/d milk, 300 mg/d low Ca foods  Vitamin D, 2000 IU/d    25-OH D 61 ng/mL 2019 with no supplement  Exercise, nothing regular. Pharmacologic therapy, none    PREVIOUS MEDICATIONS FOR OSTEOPOROSIS. None    OTHER CURRENT MEDICATIONS. Effexor, losartan, folic acid  OTC MEDICATIONS. Colace, biotin, niacin    CHIEF COMPLAINT. Here for f/u of osteoporosis, hypercalciuria, monitoring treatment. No new related signs or symptoms. INTERVAL HISTORY. See problem list for chronic/inactive conditions. She has been taking HCTZ correctly and without side effects. No falls, near-falls or fractures. Feels well overall. FOR FULL DETAILS OF FAMILY HISTORY, PAST MEDICAL AND SURGICAL HISTORY, SOCIAL HISTORY, AND REVIEW OF SYSTEMS, SEE PATIENT QUESTIONNAIRE OF  DATE. PHYSICAL EXAMINATION. GENERAL. Well-nourished, well-developed, normally proportioned adult. MENTAL STATUS. Pleasant mood. Oriented to time, place, and person. MUSCULOSKELETAL. Spinal contours are normal.  No spine tenderness to palpation or percussion. Three finger spaces between ribs and pelvis. Gait steady without assistance. NEUROLOGICAL.  Able to rise from chair without using arms. No apparent focal motor or sensory deficit. Coordination appears normal.     BONE DENSITY. Most recent done here using Hologic equipment. T-scores  Initial study: 10/11/2012 L1-L4 -2.2 left fem. neck -2.6   Current study: 08/03/2020 L1-L4 -2.5 left fem. neck -3.1     The table below shows bone mineral density (grams/cm2), the appropriate measure for comparing serial scans. A significant increase or decrease is based on precision studies done at our center according to the ISCD protocol with a least significant change of 0.030 g/cm2. PA spine Proximal Femur (left)   Date L1-L4 Fem. neck Trochanter Total hip   10/11/2012 0.800 0.560 0.561 ---   01/19/2016 0.768 0.524 0.525 ---   01/31/2017 0.768 0.567 0.503 0.692   05/25/2018 0.766 0.524 0.526 0.697   07/09/2019 0.760 0.532 0.494 0.679   08/03/2020 0.773 0.501 0.508 0.685     IMPRESSION:  BONE DENSITY IS LOW, CONSISTENT WITH OSTEOPOROSIS. SINCE THE PREVIOUS DXA, BMD DECREASED IN THE FEMORAL NECK AND DID NOT CHANGE SIGNIFICANTLY AT OTHER SITES MEASURED. Labs: 11/2012, CBC, TSH, CMP. 08/2015, Ca 9.7, Cr 0.7. 02/2016 . 03/2016 Ca 9.3 Cr 0.7.  08/2016 Ca 9.4 Cr 0.5 PTH 31, 1,25 D 62 (20-79). 09/2016 Ca 9.1 Cr 0.7 K 3.4. 09/2017 Ca 9.9 Cr 0.5.  03/2019 Ca 9.7 Cr 0.6. 03/2020 Ca 9.9 Cr 0.6. X-rays viewed: DXA printouts reviewed. 12/2013 abd CT.    ASSESSMENT. Osteoporosis, bone density lower than desirable. BMD decreased substantially 2214-2382 but was stable/increased 8059-4629. Femoral neck BMD decreased 1835-2241. Using FRAX, 10-year hip fracture risk is now over the intervention threshold. 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 PLANS. Continue HCTZ 25 mg/d. We had a lengthy discussion about treatment options (alendronate, zoledronate, Prolia). I recommended alendronate 70 mg weekly and she agreed. Rx sent, dosing instructions reviewed. Return appointment with DXA in 1 year.     I spent 45 minutes face to face with

## 2020-11-25 ENCOUNTER — TELEPHONE (OUTPATIENT)
Dept: ENDOCRINOLOGY | Age: 60
End: 2020-11-25

## 2020-11-25 NOTE — TELEPHONE ENCOUNTER
Per Dr. Sherry Fagan, No reason to think that HCTZ would cause a low white blood cell count.   Patient acknowledged

## 2020-12-29 ENCOUNTER — OFFICE VISIT (OUTPATIENT)
Dept: RHEUMATOLOGY | Age: 60
End: 2020-12-29
Payer: COMMERCIAL

## 2020-12-29 ENCOUNTER — HOSPITAL ENCOUNTER (OUTPATIENT)
Age: 60
Discharge: HOME | End: 2020-12-29
Payer: COMMERCIAL

## 2020-12-29 VITALS
BODY MASS INDEX: 18.22 KG/M2 | WEIGHT: 120.2 LBS | SYSTOLIC BLOOD PRESSURE: 121 MMHG | TEMPERATURE: 96.9 F | HEART RATE: 70 BPM | HEIGHT: 68 IN | DIASTOLIC BLOOD PRESSURE: 65 MMHG

## 2020-12-29 LAB
C3 COMPLEMENT: 115 MG/DL (ref 90–180)
C4 COMPLEMENT: 13.8 MG/DL (ref 10–40)
HEPATITIS B CORE IGM ANTIBODY: NORMAL
HEPATITIS B SURFACE ANTIGEN INTERPRETATION: NORMAL
HEPATITIS C ANTIBODY INTERPRETATION: NORMAL
RHEUMATOID FACTOR: <10 IU/ML
TSH REFLEX FT4: 1.37 UIU/ML (ref 0.27–4.2)

## 2020-12-29 PROCEDURE — 36415 COLL VENOUS BLD VENIPUNCTURE: CPT

## 2020-12-29 PROCEDURE — 85610 PROTHROMBIN TIME: CPT

## 2020-12-29 PROCEDURE — 86705 HEP B CORE ANTIBODY IGM: CPT

## 2020-12-29 PROCEDURE — 86803 HEPATITIS C AB TEST: CPT

## 2020-12-29 PROCEDURE — 87340 HEPATITIS B SURFACE AG IA: CPT

## 2020-12-29 PROCEDURE — 86235 NUCLEAR ANTIGEN ANTIBODY: CPT

## 2020-12-29 PROCEDURE — 85613 RUSSELL VIPER VENOM DILUTED: CPT

## 2020-12-29 PROCEDURE — 84165 PROTEIN E-PHORESIS SERUM: CPT

## 2020-12-29 PROCEDURE — 99244 OFF/OP CNSLTJ NEW/EST MOD 40: CPT | Performed by: INTERNAL MEDICINE

## 2020-12-29 PROCEDURE — 84155 ASSAY OF PROTEIN SERUM: CPT

## 2020-12-29 PROCEDURE — 85730 THROMBOPLASTIN TIME PARTIAL: CPT

## 2020-12-29 PROCEDURE — 86200 CCP ANTIBODY: CPT

## 2020-12-29 PROCEDURE — 86146 BETA-2 GLYCOPROTEIN ANTIBODY: CPT

## 2020-12-29 PROCEDURE — 84443 ASSAY THYROID STIM HORMONE: CPT

## 2020-12-29 PROCEDURE — 86431 RHEUMATOID FACTOR QUANT: CPT

## 2020-12-29 PROCEDURE — 86147 CARDIOLIPIN ANTIBODY EA IG: CPT

## 2020-12-29 PROCEDURE — 86160 COMPLEMENT ANTIGEN: CPT

## 2020-12-29 NOTE — LETTER
[facility] Rheumatology  [address] [id number]  Phone: [phone]  Fax: [phone]    Yaneth Graves MD        December 29, 2020     No referring provider defined for this encounter. Viji Olivas MD  [address] [facility]    Patient: Gabriel Petersen  MR Number: [id number]  YOB: 1960  Date of Visit: 12/29/2020    Dear Dr. Viji Olivas:    Thank you for your referral. Progress note attached in visit summary. If you have questions, please do not hesitate to call me. I look forward to following Shayy Cox along with you.     Sincerely,        Yaneth Graves MD

## 2020-12-29 NOTE — PROGRESS NOTES
2020  Patient Name: Fermin Sender  : 1960  Medical Record: [id number]    MEDICATIONS  Current Outpatient Medications   Medication Sig Dispense Refill    hydroCHLOROthiazide (HYDRODIURIL) 25 MG tablet TAKE ONE TABLET BY MOUTH EVERY MORNING 90 tablet 4    alendronate (FOSAMAX) 70 MG tablet Take 1 tablet by mouth once a week 12 tablet 4    hydrochlorothiazide (HYDRODIURIL) 25 MG tablet TAKE ONE TABLET BY MOUTH EVERY MORNING 90 tablet 3    Efinaconazole (JUBLIA) 10 % SOLN Apply to multiple affected nails daily for 48 weeks. 8 mL 2    Coenzyme Q10 (CO Q-10) 100 MG CAPS Take by mouth      Biotin 2.5 MG CAPS Take by mouth      metroNIDAZOLE (METROCREAM) 0.75 % cream Apply to face bid. 45 g 3    Calcium Carbonate-Vit D-Min (CALTRATE PLUS PO) Take by mouth      Cholecalciferol (VITAMIN D) 2000 UNITS CAPS capsule Take 1,000 Units by mouth       folic acid (FOLVITE) 889 MCG tablet Take 800 mcg by mouth daily      losartan (COZAAR) 50 MG tablet Take 50 mg by mouth 2 times daily      venlafaxine (EFFEXOR) 37.5 MG tablet Take 37.5 mg by mouth 2 times daily       docusate sodium (COLACE) 100 MG capsule Take 100 mg by mouth daily Takes two tablets at night       No current facility-administered medications for this visit. ALLERGIES  No Known Allergies      Comments  No specialty comments available. REFERRING PHYSICIAN: Dr. Eyal Saucedo is a 63 y.o. female with past medical history of hypertension, osteoporosis, hemochromatosis who is being seen for follow up evaluation of  positive MICKI. She was evaluated by oncologist Dr. Cody Bates for chronic leukopenia which was felt to be benign. She had a further work-up to rule out connective tissue disease that showed positive any 1: 640 speckled pattern. dsDNA, anti-Arce, RNP, SSA/SSB came back negative. She denies any family history of lupus. Her sister has scleroderma.   She denies malar rash, photosensitivity, oral/nasal ulcers, alopecia, dry eyes/dry mouth, history of miscarriages or pregnancy complications, blood clots, kidney diseases, pleurisy or pericarditis, sclerodactyly, skin thickening, dysphagia, shortness of breath or severe heartburn. She denies any joint pain or swelling or stiffness. She has Raynaud's mainly involving her fingers. Her fingers turn white-purple-red on exposure to cold. She denies any fingertip ulcerations or pitting scars. She denies psoriasis, inflammatory bowel disease, inflammatory back pain, dactylitis, enthesitis, tenosynovitis. She denies fever, weight loss or swollen glands. She has a history of hemochromatosis. She gets multiple phlebotomies. HPI  Review of Systems    REVIEW OF SYSTEMS: Positive for Raynaud's, osteoporosis  Constitutional: No unanticipated weight loss or fevers. No fatigue and malaise. Integumentary: No rash, photosensitivity, malar rash, livedo reticularis, alopecia and  sclerodactyly, skin tightening  Eyes: negative for visual disturbance and persistent redness, discharge from eyes   ENT: - No tinnitus, loss of hearing, vertigo, or recurrent ear infections.  - No history of nasal/oral ulcers. - No history of dry eyes/dry mouth  Cardiovascular: No history of pericarditis, chest pain or murmur or palpitations  Respiratory: No shortness of breath, cough or history of interstitial lung disease. No history of pleurisy. No history of tuberculosis or atypical infections. Gastrointestinal: No history of heart burn, dysphagia or esophageal dysmotility. No change in bowel habits or any inflammatory bowel disease. Genitourinary: No history renal disease, miscarriages. Hematologic/Lymphatic: No abnormal bruising or bleeding, blood clots or swollen lymph nodes. Musculoskeletal: Denies having any swollen joints, joint pains or stiffness   Neurological: No history of headaches, seizure or focal weakness.  No history of neuropathies, paresthesias or Not on file   Other Topics Concern    Not on file   Social History Narrative    Not on file     Family History   Problem Relation Age of Onset    High Blood Pressure Mother     Cancer Sister         skin, NMSC    Other Sister         Hemochromatosis    Other Father         Hemochromatosis         PHYSICAL EXAM   Vitals:    12/29/20 1009   BP: 121/65   Pulse: 70   Temp: 96.9 °F (36.1 °C)   Weight: 120 lb 3.2 oz (54.5 kg)   Height: 5' 8.25\" (1.734 m)     Physical Exam  Constitutional:  Well developed, well nourished, no acute distress, non-toxic appearance   Musculoskeletal:    RIGHT  Swell  Tender  ROM  LEFT  Swell  Tender  ROM    DIP2  0  0   Heberden  0  0   Heberden   DIP3  0  0   Heberden  0  0   Heberden   DIP4  0  0   Heberden  0  0   Heberden   DIP5  0  0   Heberden  0  0   Heberden   PIP1  0  0   bony change  0  0   bony change   PIP2  0  0   bony change  0  0   bony change   PIP3  0  0   bony change  0  0   bony change   PIP4  0  0   bony change  0  0   bony change   PIP5  0  0  FULL   0  0  FULL    MCP1  0  0   bony change  0  0   bony change   MCP2  0  0  FULL   0  0  FULL    MCP3  0  0  FULL   0  0  FULL    MCP4  0  0  FULL   0  0  FULL    MCP5  0  0  FULL   0  0  FULL    Wrist  0  0  FULL   0  0  FULL    Elbow  0  0  FULL   0  0  FULL    Shouldr  0  0  FULL   0  0  FULL    Hip  0  0  FULL   0  0  FULL    Knee  0  0   crepitus  0  0   crepitus she did   Ankle  0  0  FULL   0  0  FULL    MTP1  0  0  FULL   0  0  FULL    MTP2  0  0  FULL   0  0  FULL    MTP3  0  0  FULL   0  0  FULL    MTP4  0  0  FULL   0  0  FULL    MTP5  0  0  FULL   0  0  FULL    IP1  0  0  FULL   0  0  FULL    IP2  0  0  FULL   0  0  FULL    IP3  0  0  FULL   0  0  FULL    IP4  0  0  FULL   0  0  FULL    IP5  0  0  FULL   0 0 FULL       Ambulates without assistance, normal gait  Neck: Full ROM, no tenderness,supple   Back- no tenderness.   Eyes:  PERRL, extra ocular movements intact, conjunctiva normal   HEENT:  Atraumatic, normocephalic, external ears normal, oropharynx moist, no pharyngeal exudates. Respiratory:  No respiratory distress  GI:  Soft, nondistended, normal bowel sounds, nontender, noorganomegaly, no mass, no rebound, no guarding   :  No costovertebral angle tenderness   Integument:  Well hydrated, no rash or telangiectasias  Lymphatic:  No lymphadenopathy noted   Neurologic:   Alert & oriented x 3, CN 2-12 normal, no focal deficits noted. Sensations Intact. Muscle strength 5/5 proximallyand distally in upper and lower extremities.    Psychiatric:  Speech and behavior appropriate           LABS AND IMAGING  Outside data reviewed and in HPI    Lab Results   Component Value Date    WBC 2.8 11/12/2020    RBC 4.05 11/12/2020    HGB 13.6 11/12/2020    HCT 40.2 11/12/2020     11/12/2020    MCV 99.3 11/12/2020    MCH 33.5 11/12/2020    MCHC 33.8 11/12/2020    RDW 13.6 11/12/2020    SEGSPCT 55.0 09/26/2012    LYMPHOPCT 30.1 11/12/2020    MONOPCT 10.5 11/12/2020    EOSPCT 2.1 01/25/2011    BASOPCT 1.0 11/12/2020    MONOSABS 0.3 11/12/2020    LYMPHSABS 0.8 11/12/2020    EOSABS 0.1 11/12/2020    BASOSABS 0.0 11/12/2020    DIFFTYPE Auto-K 09/26/2012       Chemistry        Component Value Date/Time     11/12/2020 0729    K 4.4 11/12/2020 0729     11/12/2020 0729    CO2 31 11/12/2020 0729    BUN 16 11/12/2020 0729    CREATININE 0.6 11/12/2020 0729        Component Value Date/Time    CALCIUM 9.8 11/12/2020 0729    ALKPHOS 69 11/12/2020 0727    AST 20 11/12/2020 0727    ALT 14 11/12/2020 0727    BILITOT 0.3 11/12/2020 0727          No results found for: SEDRATE  No results found for: CRP  No results found for: MICKI, ZHOU, SSA, SSB, C3, C4  No results found for: RF, CCPABIGG  No results found for: MICKI, ANATITER, ANAINT, PATH  No results found for: DSDNAG, DSDNAIGGIFA  No results found for: SSAROAB, SSALAAB  No results found for: SMAB, RNPAB  No results found for: CENTABIGG  No results found for: C3, C4, ACE  Lab Results   Component Value Date    VITD25 60.8 03/18/2019     No results found for: Weston Hernandez  No results found for: Troy Rothman  Lab Results   Component Value Date    TSH 1.45 03/18/2019     Lab Results   Component Value Date    VITD25 60.8 03/18/2019     Labs reviewed in media section  December 1, 2020  MICKI 1: 640 speckled pattern  WBC 3.5  Hemoglobin 11  Platelet 432  dsDNA negative  SSA/SSB negative  RNP negative  Anti-Cesia 1 -    ASSESSMENT AND PLAN      Assessment/Plan:      ASSESSMENT:    1. Positive MICKI (antinuclear antibody)    2. Raynaud's disease without gangrene    3. Other decreased white blood cell (WBC) count        PLAN:     Kathleen Crabtree was seen today for establish care. Diagnoses and all orders for this visit:    Positive MICKI (antinuclear antibody)  -     Beta-2 Glycoprotein Antibodies; Future  -     C3 Complement; Future  -     C4 Complement; Future  -     Cardiolipin Antibodies IgG & IgM; Future  -     Cardiolipin Antibody, IgA; Future  -     Lupus Anticoagulant; Future  -     Cyclic Citrul Peptide Antibody, IgG; Future  -     Rheumatoid Factor; Future  -     Hepatitis B Core Antibody, IgM; Future  -     Hepatitis B Surface Antigen; Future    Raynaud's disease without gangrene  -     Hepatitis C Antibody; Future  -     TSH WITH REFLEX TO FT4; Future  -     Anti-Scleroderma Antibody; Future  -     Anti-Centromere B; Future  -     Electrophoresis Protein, Serum without Reflex to Immunofixation; Future    Other decreased white blood cell (WBC) count       -Positive any 1: 640 speckled pattern. No other signs and symptoms concerning for lupus or systemic rheumatic disease. No active synovitis on joint exam.  SSA/SSB, RNP, dsDNA, anti-Smith negative  Implications of low titer positive MICKI were discussed with patient. About 15-20% of normal healthy individuals at her age may have low titer positive MICKI of unclear clinical significance.   I will check additional serologies to completely rule out

## 2020-12-30 LAB
ANTI-CENTROMERE B IGG: <0.2 AI (ref 0–0.9)
ANTI-SCL70 IGG: <0.2 AI (ref 0–0.9)
CYCLIC CITRULLINATED PEPTIDE ANTIBODY IGG: 0.5 U/ML (ref 0–2.9)

## 2020-12-31 LAB
ALBUMIN SERPL-MCNC: 4.3 G/DL (ref 3.1–4.9)
ALPHA-1-GLOBULIN: 0.3 G/DL (ref 0.2–0.4)
ALPHA-2-GLOBULIN: 0.9 G/DL (ref 0.4–1.1)
ANTICARDIOLIPIN IGA ANTIBODY: 2 APL (ref 0–11)
ANTICARDIOLIPIN IGG ANTIBODY: 0 GPL (ref 0–14)
BETA GLOBULIN: 1 G/DL (ref 0.9–1.6)
BETA-2 GLYCOPROTEIN 1 IGG ANTIBODY: 0 SGU (ref 0–20)
BETA-2 GLYCOPROTEIN 1 IGM ANTIBODY: 4 SMU (ref 0–20)
CARDIOLIPIN AB IGM: 0 MPL (ref 0–12)
DRVVT CONFIRMATION TEST: ABNORMAL RATIO
DRVVT SCREEN: 32 SEC (ref 33–44)
DRVVT,DIL: ABNORMAL SEC (ref 33–44)
GAMMA GLOBULIN: 0.8 G/DL (ref 0.6–1.8)
HEXAGONAL PHOSPHOLIPID NEUTRALIZAT TEST: ABNORMAL
LUPUS ANTICOAG INTERP: ABNORMAL
PLT NEUTA: ABNORMAL
PT D: 12 SEC (ref 12–15.5)
PTT D: 33 SEC (ref 32–48)
PTT-D CORR REFLEX: ABNORMAL SEC (ref 32–48)
PTT-HEPARIN NEUTRALIZED: ABNORMAL SEC (ref 32–48)
REPTILASE TIME: ABNORMAL SEC
SPE/IFE INTERPRETATION: NORMAL
THROMBIN TIME: ABNORMAL SEC (ref 14.7–19.5)
TOTAL PROTEIN: 7.3 G/DL (ref 6.4–8.2)

## 2021-01-06 ENCOUNTER — HOSPITAL ENCOUNTER (OUTPATIENT)
Dept: WOMENS IMAGING | Age: 61
Discharge: HOME | End: 2021-01-06
Payer: COMMERCIAL

## 2021-01-06 DIAGNOSIS — Z12.31 VISIT FOR SCREENING MAMMOGRAM: ICD-10-CM

## 2021-01-06 PROCEDURE — 77063 BREAST TOMOSYNTHESIS BI: CPT

## 2021-03-01 ENCOUNTER — VIRTUAL VISIT (OUTPATIENT)
Dept: RHEUMATOLOGY | Age: 61
End: 2021-03-01
Payer: COMMERCIAL

## 2021-03-01 DIAGNOSIS — I73.00 RAYNAUD'S DISEASE WITHOUT GANGRENE: ICD-10-CM

## 2021-03-01 DIAGNOSIS — D72.818 OTHER DECREASED WHITE BLOOD CELL (WBC) COUNT: ICD-10-CM

## 2021-03-01 DIAGNOSIS — R76.8 POSITIVE ANA (ANTINUCLEAR ANTIBODY): Primary | ICD-10-CM

## 2021-03-01 PROCEDURE — 99213 OFFICE O/P EST LOW 20 MIN: CPT | Performed by: INTERNAL MEDICINE

## 2021-03-01 NOTE — PROGRESS NOTES
3/1/2021   Fe Suero, was evaluated through a synchronous (real-time) audio-video encounter. The patient (or guardian if applicable) is aware that this is a billable service. Verbal consent to proceed has been obtained within the past 12 months. The visit was conducted pursuant to the emergency declaration under the [facility], [address] [place] [address] and the [employer] Act. Patient identification was verified, and a caregiver was present when appropriate. The patient was located in a state where the provider was credentialed to provide care. Total time spent for this encounter: Not billed by time    --Radha Murdock MD on 3/1/2021 at 3:39 PM    An electronic signature was used to authenticate this note. Patient Name: Fe Suero  : 1960  Medical Record: [id number]    MEDICATIONS  Current Outpatient Medications   Medication Sig Dispense Refill    hydroCHLOROthiazide (HYDRODIURIL) 25 MG tablet TAKE ONE TABLET BY MOUTH EVERY MORNING 90 tablet 4    alendronate (FOSAMAX) 70 MG tablet Take 1 tablet by mouth once a week 12 tablet 4    hydrochlorothiazide (HYDRODIURIL) 25 MG tablet TAKE ONE TABLET BY MOUTH EVERY MORNING 90 tablet 3    Efinaconazole (JUBLIA) 10 % SOLN Apply to multiple affected nails daily for 48 weeks. 8 mL 2    Coenzyme Q10 (CO Q-10) 100 MG CAPS Take by mouth      Biotin 2.5 MG CAPS Take by mouth      metroNIDAZOLE (METROCREAM) 0.75 % cream Apply to face bid.  45 g 3    Calcium Carbonate-Vit D-Min (CALTRATE PLUS PO) Take by mouth      Cholecalciferol (VITAMIN D) 2000 UNITS CAPS capsule Take 1,000 Units by mouth       folic acid (FOLVITE) 936 MCG tablet Take 800 mcg by mouth daily      losartan (COZAAR) 50 MG tablet Take 50 mg by mouth 2 times daily      venlafaxine (EFFEXOR) 37.5 MG tablet Take 37.5 mg by mouth 2 times daily  docusate sodium (COLACE) 100 MG capsule Take 100 mg by mouth daily Takes two tablets at night       No current facility-administered medications for this visit. ALLERGIES  No Known Allergies      Comments  No specialty comments available. Background history:  Luis Daniel Childress is a 63 y.o. female with past medical history of hypertension, osteoporosis, hemochromatosis who is being seen for follow up evaluation of  positive MICKI. She was evaluated by oncologist Dr. Claudia Alba for chronic leukopenia which was felt to be benign. She had a further work-up to rule out connective tissue disease that showed positive any 1: 640 speckled pattern. dsDNA, anti-Arce, RNP, SSA/SSB came back negative. She denies any family history of lupus. Her sister has scleroderma. She denies malar rash, photosensitivity, oral/nasal ulcers, alopecia, dry eyes/dry mouth, history of miscarriages or pregnancy complications, blood clots, kidney diseases, pleurisy or pericarditis, sclerodactyly, skin thickening, dysphagia, shortness of breath or severe heartburn. She denies any joint pain or swelling or stiffness. She has Raynaud's mainly involving her fingers. Her fingers turn white-purple-red on exposure to cold. She denies any fingertip ulcerations or pitting scars. She denies psoriasis, inflammatory bowel disease, inflammatory back pain, dactylitis, enthesitis, tenosynovitis. She denies fever, weight loss or swollen glands. She has a history of hemochromatosis. She gets multiple phlebotomies. Neurological: No history of headaches, seizure or focal weakness. No history of neuropathies, paresthesias or hyperesthesias, facial droop, diplopia  Psychiatric: No history of bipolar disease, anxiety, depression  Endocrine: Denies any polyuria, polydipsia   Allergic/Immunologic: No nasal congestion or hives. I have reviewed patients Past medical History, Social History and Family History as mentioned in her chart and this remains unchanged fromprevious. Past Medical History:   Diagnosis Date    Hemochromatosis     Hypertension     Osteoporosis      Past Surgical History:   Procedure Laterality Date    BONE MARROW BIOPSY      COLONOSCOPY      HEMORRHOID SURGERY      LIVER BIOPSY       Social History     Socioeconomic History    Marital status:      Spouse name: Not on file    Number of children: Not on file    Years of education: Not on file    Highest education level: Not on file   Occupational History    Not on file   Social Needs    Financial resource strain: Not on file    Food insecurity     Worry: Not on file     Inability: Not on file    Transportation needs     Medical: Not on file     Non-medical: Not on file   Tobacco Use    Smoking status: Never Smoker    Smokeless tobacco: Never Used   Substance and Sexual Activity    Alcohol use:  Yes     Alcohol/week: 1.0 standard drinks     Types: 1 Glasses of wine per week     Comment: daily    Drug use: No    Sexual activity: Yes     Partners: Male   Lifestyle    Physical activity     Days per week: Not on file     Minutes per session: Not on file    Stress: Not on file   Relationships    Social connections     Talks on phone: Not on file     Gets together: Not on file     Attends Yazidism service: Not on file     Active member of club or organization: Not on file     Attends meetings of clubs or organizations: Not on file     Relationship status: Not on file    Intimate partner violence Fear of current or ex partner: Not on file     Emotionally abused: Not on file     Physically abused: Not on file     Forced sexual activity: Not on file   Other Topics Concern    Not on file   Social History Narrative    Not on file     Family History   Problem Relation Age of Onset    High Blood Pressure Mother     Cancer Sister         skin, NMSC    Other Sister         Hemochromatosis    Other Father         Hemochromatosis       PHYSICAL EXAMINATION:  [ INSTRUCTIONS:  \"[x]\" Indicates a positive item  \"[]\" Indicates a negative item  -- DELETE ALL ITEMS NOT EXAMINED]  Vital Signs: (As obtained by patient/caregiver or practitioner observation)    Blood pressure-  Heart rate-    Respiratory rate-    Temperature-  Pulse oximetry-     Constitutional: [x] Appears well-developed and well-nourished [x] No apparent distress      [] Abnormal-   Mental status  [x] Alert and awake  [x] Oriented to person/place/time []Able to follow commands      Eyes:  EOM    [x]  Normal  [] Abnormal-  Sclera  [x]  Normal  [] Abnormal -         Discharge [x]  None visible  [] Abnormal -    HENT:   [x] Normocephalic, atraumatic.   [] Abnormal   [x] Mouth/Throat: Mucous membranes are moist.     External Ears [x] Normal  [] Abnormal-     Neck: [x] No visualized mass     Pulmonary/Chest: [x] Respiratory effort normal.  [x] No visualized signs of difficulty breathing or respiratory distress        [] Abnormal-      Musculoskeletal:   [x] Normal gait with no signs of ataxia         [x] Normal range of motion of neck        [] Abnormal-       Neurological:        [x] No Facial Asymmetry (Cranial nerve 7 motor function) (limited exam to video visit)          [x] No gaze palsy        [] Abnormal-         Skin:        [x] No significant exanthematous lesions or discoloration noted on facial skin         [] Abnormal-            Psychiatric:       [x] Normal Affect [x] No Hallucinations        [] Abnormal-         LABS AND IMAGING Assessment/Plan:      ASSESSMENT:    1. Positive MICKI (antinuclear antibody)    2. Raynaud's disease without gangrene    3. Other decreased white blood cell (WBC) count        PLAN:     Deshawn Chatterjee was seen today for follow-up. Diagnoses and all orders for this visit:    Positive MICKI (antinuclear antibody)    Raynaud's disease without gangrene    Other decreased white blood cell (WBC) count       -Positive any 1: 640 speckled pattern. No other signs and symptoms concerning for lupus or systemic rheumatic disease. No active synovitis on joint exam.  SSA/SSB, RNP, dsDNA, anti-Smith, C3-C4, APL panel, SCL 70, antiscleroderma antibody negative  She has Raynaud's, leukopenia/neutropenia. No other signs and symptoms concerning for lupus or systemic rheumatic disease. No active synovitis on joint exam  Most likely possibility undifferentiated connective tissue disease  We will continue to monitor periodically  Followed by hematologist for leukopenia/neutropenia. Felt to be benign     The patient indicates understanding of these issues and agrees with the plan. Return in about 6 months (around 9/1/2021). The risks and benefits of my recommendations, as well as other treatment options, benefits and side effects werediscussed with the patient. All questions were answered. ######################################################################    I thank you for giving me theopportunity to participate in [facility]. If you have any questions or concerns please feel free to contact me. I look forward to following  Deshawn Chatterjee along with you. Electronically signed by: Melissa Hutton MD, 3/1/2021 3:39 PM    Documentation was done using voice recognition dragon software. Every effort was made to ensure accuracy;however, inadvertent unintentional computerized transcription errors may be present.

## 2021-04-05 NOTE — PROGRESS NOTES
[facility]) Dermatology  Taya Higgins MD  [phone]      Gladys Trujillo  1960    63 y.o. female     Date of Visit: 4/6/2021    Last Visit: 1yr    Chief Complaint: Moles    History of Present Illness:  1. Here for skin/mole check. No new moles. No moles changing in size, shape, color. No moles associated w/ pain, bleeding, pruritus.   -Uses SPF 15 sunscreen more regularly but does spend a fair amount of time in sun   -Went to [facility] in Feb     2. Several year history of persistent increasing in number asymptomatic lesions on trunk     Derm History:   Onychomycosis of L 1st toenail - jublia   PP rosacea - metrocream as spot treatment     Review of Systems:  Constitutional: Reports general sense of well-being. Skin: No interval severe sunburns. Allergies: Reviewed and updated. Past Medical History, Surgical History, Medications and Allergies reviewed. Past Medical History:   Diagnosis Date    Hemochromatosis     Hypertension     Osteoporosis      Past Surgical History:   Procedure Laterality Date    BONE MARROW BIOPSY      COLONOSCOPY      HEMORRHOID SURGERY      LIVER BIOPSY         No Known Allergies  Outpatient Medications Marked as Taking for the 4/6/21 encounter (Office Visit) with Taya Higgins MD   Medication Sig Dispense Refill    hydroCHLOROthiazide (HYDRODIURIL) 25 MG tablet TAKE ONE TABLET BY MOUTH EVERY MORNING 90 tablet 4    alendronate (FOSAMAX) 70 MG tablet Take 1 tablet by mouth once a week 12 tablet 4    hydrochlorothiazide (HYDRODIURIL) 25 MG tablet TAKE ONE TABLET BY MOUTH EVERY MORNING 90 tablet 3    Efinaconazole (JUBLIA) 10 % SOLN Apply to multiple affected nails daily for 48 weeks. 8 mL 2    Coenzyme Q10 (CO Q-10) 100 MG CAPS Take by mouth      Biotin 2.5 MG CAPS Take by mouth      metroNIDAZOLE (METROCREAM) 0.75 % cream Apply to face bid.  45 g 3    Calcium Carbonate-Vit D-Min (CALTRATE PLUS PO) Take by mouth      Cholecalciferol (VITAMIN D) 2000 UNITS CAPS capsule Take 1,000 Units by mouth       folic acid (FOLVITE) 527 MCG tablet Take 800 mcg by mouth daily      losartan (COZAAR) 50 MG tablet Take 50 mg by mouth 2 times daily      venlafaxine (EFFEXOR) 37.5 MG tablet Take 37.5 mg by mouth 2 times daily       docusate sodium (COLACE) 100 MG capsule Take 100 mg by mouth daily Takes two tablets at night       Social History: Cardiology NP    Physical Examination     The following were examined and determined to be normal: Psych/Neuro, Scalp/hair, Head/face, Conjunctivae/eyelids, Gums/teeth/lips, Neck, Nails/digits and Genitalia/groin/buttocks. The following were examined and determined to be abnormal: Breast/axilla/chest, Abdomen, Back, RUE, LUE, RLE and LLE. -General: Well-appearing, NAD  1. Scattered on the trunk and extremities are multiple well-defined round and oval symmetric smoothly-bordered uniformly brown macules and papules. 2. Chest, back - well-defined \"stuck-on\" verrucous tan-brown papule(s)     Assessment and Plan     1. Benign acquired melanocytic nevi  -Recommend monthly self skin exams   -Educated regarding the ABCDEs of melanoma detection   -Call for any new/changing moles or concerning lesions  -Reviewed sun protective behavior -- sun avoidance during the peak hours of the day, sun-protective clothing (including hat and sunglasses), sunscreen use (water resistant, broad spectrum, SPF at least 30, need for reapplication every 2 to 3 hours), avoidance of tanning beds   -Return for full skin exam in 1 year (sooner if indicated)     3. Seborrheic keratosis(es)  -Reassurance re: benignity  -No treatment performed.

## 2021-04-06 ENCOUNTER — OFFICE VISIT (OUTPATIENT)
Dept: DERMATOLOGY | Age: 61
End: 2021-04-06
Payer: COMMERCIAL

## 2021-04-06 VITALS — TEMPERATURE: 97.3 F

## 2021-04-06 DIAGNOSIS — Z12.83 SCREENING EXAM FOR SKIN CANCER: ICD-10-CM

## 2021-04-06 DIAGNOSIS — L82.1 SEBORRHEIC KERATOSES: ICD-10-CM

## 2021-04-06 DIAGNOSIS — D22.9 MULTIPLE BENIGN NEVI: Primary | ICD-10-CM

## 2021-04-06 PROCEDURE — 99213 OFFICE O/P EST LOW 20 MIN: CPT | Performed by: DERMATOLOGY

## 2021-09-14 ENCOUNTER — PROCEDURE VISIT (OUTPATIENT)
Dept: ENDOCRINOLOGY | Age: 61
End: 2021-09-14

## 2021-09-14 ENCOUNTER — OFFICE VISIT (OUTPATIENT)
Dept: ENDOCRINOLOGY | Age: 61
End: 2021-09-14
Payer: COMMERCIAL

## 2021-09-14 ENCOUNTER — HOSPITAL ENCOUNTER (OUTPATIENT)
Dept: GENERAL RADIOLOGY | Age: 61
Discharge: HOME | End: 2021-09-14
Payer: COMMERCIAL

## 2021-09-14 VITALS
HEIGHT: 68 IN | SYSTOLIC BLOOD PRESSURE: 116 MMHG | WEIGHT: 116.6 LBS | BODY MASS INDEX: 17.67 KG/M2 | DIASTOLIC BLOOD PRESSURE: 61 MMHG

## 2021-09-14 DIAGNOSIS — R82.994 HYPERCALCIURIA: Chronic | ICD-10-CM

## 2021-09-14 DIAGNOSIS — Z51.81 MEDICATION MONITORING ENCOUNTER: Chronic | ICD-10-CM

## 2021-09-14 DIAGNOSIS — M81.0 OSTEOPOROSIS, POSTMENOPAUSAL: Chronic | ICD-10-CM

## 2021-09-14 DIAGNOSIS — M81.0 OSTEOPOROSIS, POSTMENOPAUSAL: ICD-10-CM

## 2021-09-14 DIAGNOSIS — M81.0 OSTEOPOROSIS, POSTMENOPAUSAL: Primary | Chronic | ICD-10-CM

## 2021-09-14 DIAGNOSIS — I10 ESSENTIAL HYPERTENSION: ICD-10-CM

## 2021-09-14 PROCEDURE — 77080 DXA BONE DENSITY AXIAL: CPT | Performed by: INTERNAL MEDICINE

## 2021-09-14 PROCEDURE — 77080 DXA BONE DENSITY AXIAL: CPT

## 2021-09-14 PROCEDURE — 99215 OFFICE O/P EST HI 40 MIN: CPT | Performed by: INTERNAL MEDICINE

## 2021-09-14 RX ORDER — ALENDRONATE SODIUM 70 MG/1
70 TABLET ORAL WEEKLY
Qty: 12 TABLET | Refills: 4 | Status: SHIPPED | OUTPATIENT
Start: 2021-09-14 | End: 2022-09-18 | Stop reason: SDUPTHER

## 2021-09-14 RX ORDER — HYDROCHLOROTHIAZIDE 25 MG/1
TABLET ORAL
Qty: 90 TABLET | Refills: 3 | Status: SHIPPED | OUTPATIENT
Start: 2021-09-14 | End: 2022-09-18 | Stop reason: SDUPTHER

## 2021-09-14 RX ORDER — ESTRADIOL 10 UG/1
INSERT VAGINAL
COMMUNITY

## 2021-09-14 NOTE — PROGRESS NOTES
[facility] ([facility]) Osteoporosis and [address] [facility]  [employer] [address], [address]  Phone [phone]  Fax [phone]    NAME:  Kenn Mclaughlin  : 1960  CONSULT DATE: 2013  MOST RECENT VISIT: 2020  TODAYS DATE: 2021    Labs @ [facility] 2021    PROBLEMS. Osteoporosis by DXA 10/2012, lowest T-score -2.6 in the left femoral neck  Natural menopause age ~53 (~)  Hemochromatosis diagnosed   Hypertension  Hypercalciuria, normal 24-h urine calcium for her is 100-220 mg/d    293 mg/d 2016 on no treatment    296 mg/d 2016 with HCTZ 12.5 mg/d    136 mg/d 2016 with HCTZ 25 mg/d  Hypokalemia, 2016 K 3.4    CURRENT MANAGEMENT FOR OSTEOPOROSIS. Calcium, diet 550 mg/d , supplements cause constipation    250 mg/d milk, 300 mg/d low Ca foods  Vitamin D, 2000 IU/d    25-OH D 60 ng/mL 2021 with no supplement  Exercise, nothing regular. Pharmacologic therapy, alendronate 70 mg weekly started 2020    PREVIOUS MEDICATIONS FOR OSTEOPOROSIS. None    OTHER CURRENT MEDICATIONS. Effexor, losartan, folic acid  OTC MEDICATIONS. Colace, biotin, niacin    CHIEF COMPLAINT. Here for f/u of osteoporosis, hypercalciuria, monitoring treatment. No new related signs or symptoms. INTERVAL HISTORY. See problem list for chronic/inactive conditions. She has been taking HCTZ and alendronate correctly and without side effects. No falls, near-falls or fractures. Feels well overall. FOR FULL DETAILS OF FAMILY HISTORY, PAST MEDICAL AND SURGICAL HISTORY, SOCIAL HISTORY, AND REVIEW OF SYSTEMS, SEE PATIENT QUESTIONNAIRE OF TODAYS DATE. PHYSICAL EXAMINATION. GENERAL. Well-nourished, well-developed, normally proportioned adult. MENTAL STATUS. Pleasant mood. Oriented to time, place, and person. MUSCULOSKELETAL. Spinal contours are normal.  No spine tenderness to palpation or percussion. Three finger spaces between ribs and pelvis.   Gait steady without assistance. NEUROLOGICAL. Able to rise from chair without using arms. No apparent focal motor or sensory deficit. Coordination appears normal.     BONE DENSITY. Most recent done here using Hologic equipment. T-scores  Initial study: 10/11/2012 L1-L4 -2.2 left fem. neck -2.6   Current study: 09/14/2021 L1-L4 -2.0 left fem. neck -3.0     The table below shows bone mineral density (grams/cm2), the appropriate measure for comparing serial scans. A significant increase or decrease is based on precision studies done at our center according to the ISCD protocol with a least significant change of 0.030 g/cm2. PA spine Proximal Femur (left)   Date L1-L4 Fem. neck Trochanter Total hip   10/11/2012 0.800 0.560 0.561 ---   01/19/2016 0.768 0.524 0.525 ---   01/31/2017 0.768 0.567 0.503 0.692   05/25/2018 0.766 0.524 0.526 0.697   07/09/2019 0.760 0.532 0.494 0.679   08/03/2020 0.773 0.501 0.508 0.685   09/14/2021 0.830 0.512 0.513 0.700     IMPRESSION:  BONE DENSITY IS LOW, CONSISTENT WITH OSTEOPOROSIS. SINCE THE LAST DXA, BMD INCREASED IN THE SPINE AND IS TRENDING UP IN THE TOTAL HIP. Labs: 11/2012, CBC, TSH, CMP. 08/2015, Ca 9.7, Cr 0.7. 02/2016 . 03/2016 Ca 9.3 Cr 0.7.  08/2016 Ca 9.4 Cr 0.5 PTH 31, 1,25 D 62 (20-79). 09/2016 Ca 9.1 Cr 0.7 K 3.4. 09/2017 Ca 9.9 Cr 0.5.  03/2019 Ca 9.7 Cr 0.6. 03/2020 Ca 9.9 Cr 0.6. 03/2021 Ca 9.4 Cr 0.6. X-rays viewed: DXA printouts reviewed. 12/2013 abd CT.    ASSESSMENT. Osteoporosis, bone density lower than desirable. BMD decreased substantially 0267-7119 but was stable/increased 5710-4139. Femoral neck BMD decreased 3141-7967. She is doing well with alendronate started 08/2020. 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 PLANS. We had a long discussion about BMD changes with treatment and fracture risk reduction. Continue HCTZ 25 mg/d and alendronate 70 mg weekly. Return appointment with DXA in 1 year. Time spent today: 40-54 minutes. Clearance Rear Lian ELLIOTT, Director, [facility]) Osteoporosis and Bone Health Services    CC: Kelton Rowe MD

## 2021-09-14 NOTE — RESULT ENCOUNTER NOTE
[facility]) Osteoporosis and [address] [facility] [address] [facility], [address] [place] [address],[place] [address]  Phone [phone]  Fax [phone]    NAME: Mireya Del Angel   : 1960   STUDY DATE: 2021     REFERRING PROVIDER: Slava Lott MD    INDICATION(S) FOR PERFORMING THE STUDY:  osteoporosis, age-related (M81.0)    CLINICAL INFORMATION PROVIDED BY THE PATIENT: 25-year-old woman. Natural menopause was age 45. No history of fragility fractures. No long-term corticosteroid use. Current treatment is alendronate started 2020. EQUIPMENT: Hologic Discovery. POSITIONING: Good. REGIONS OF INTEREST: Correct. ARTIFACTS: None. STUDY VALID? Yes. Spine BMD is spuriously high because of generalized degenerative change;  no adjustments were made. T-scores  Initial study: 10/11/2012 L1-L4 -2.2 left fem. neck -2.6   Current study: 2021 L1-L4 -2.0 left fem. neck -3.0     The table below shows bone mineral density (grams/cm2), the appropriate measure for comparing serial scans. A significant increase or decrease is based on precision studies done at our center according to the ISCD protocol with a least significant change of 0.030 g/cm2. PA spine Proximal Femur (left)   Date L1-L4 Fem. neck Trochanter Total hip   10/11/2012 0.800 0.560 0.561 ---   2016 0.768 0.524 0.525 ---   2017 0.768 0.567 0.503 0.692   2018 0.766 0.524 0.526 0.697   2019 0.760 0.532 0.494 0.679   2020 0.773 0.501 0.508 0.685   2021 0.830 0.512 0.513 0.700     IMPRESSION:  BONE DENSITY IS LOW, CONSISTENT WITH OSTEOPOROSIS. SINCE THE LAST DXA, BMD INCREASED IN THE SPINE AND IS TRENDING UP IN THE TOTAL HIP. Consider repeating this study in 1-2 years to assess the patient's progress. _________________________________________________   Iglesia Beal MD, Director, [facility] ([facility]) Osteoporosis and [address] [facility]

## 2022-02-10 ENCOUNTER — HOSPITAL ENCOUNTER (OUTPATIENT)
Dept: WOMENS IMAGING | Age: 62
Discharge: HOME | End: 2022-02-10
Payer: COMMERCIAL

## 2022-02-10 VITALS — BODY MASS INDEX: 17.73 KG/M2 | HEIGHT: 68 IN | WEIGHT: 117 LBS

## 2022-02-10 DIAGNOSIS — Z12.31 BREAST CANCER SCREENING BY MAMMOGRAM: ICD-10-CM

## 2022-02-10 PROCEDURE — 77063 BREAST TOMOSYNTHESIS BI: CPT

## 2022-03-29 ENCOUNTER — HOSPITAL ENCOUNTER (OUTPATIENT)
Age: 62
Discharge: HOME | End: 2022-03-29
Payer: COMMERCIAL

## 2022-03-29 LAB
ALBUMIN SERPL-MCNC: 5 G/DL (ref 3.4–5)
ALP BLD-CCNC: 62 U/L (ref 40–129)
ALT SERPL-CCNC: 15 U/L (ref 10–40)
ANION GAP SERPL CALCULATED.3IONS-SCNC: 16 MMOL/L (ref 3–16)
AST SERPL-CCNC: 20 U/L (ref 15–37)
BASOPHILS ABSOLUTE: 0 K/UL (ref 0–0.2)
BASOPHILS RELATIVE PERCENT: 1 %
BILIRUB SERPL-MCNC: 0.6 MG/DL (ref 0–1)
BILIRUBIN DIRECT: <0.2 MG/DL (ref 0–0.3)
BILIRUBIN URINE: NEGATIVE
BILIRUBIN, INDIRECT: NORMAL MG/DL (ref 0–1)
BLOOD, URINE: NEGATIVE
BUN BLDV-MCNC: 16 MG/DL (ref 7–20)
CALCIUM SERPL-MCNC: 9.6 MG/DL (ref 8.3–10.6)
CHLORIDE BLD-SCNC: 99 MMOL/L (ref 99–110)
CHOLESTEROL, TOTAL: 201 MG/DL (ref 0–199)
CLARITY: CLEAR
CO2: 25 MMOL/L (ref 21–32)
COLOR: YELLOW
CREAT SERPL-MCNC: 0.6 MG/DL (ref 0.6–1.2)
EOSINOPHILS ABSOLUTE: 0.1 K/UL (ref 0–0.6)
EOSINOPHILS RELATIVE PERCENT: 4 %
GFR AFRICAN AMERICAN: >60
GFR NON-AFRICAN AMERICAN: >60
GLUCOSE BLD-MCNC: 93 MG/DL (ref 70–99)
GLUCOSE URINE: NEGATIVE MG/DL
HCT VFR BLD CALC: 41 % (ref 36–48)
HDLC SERPL-MCNC: 103 MG/DL (ref 40–60)
HEMOGLOBIN: 14.2 G/DL (ref 12–16)
KETONES, URINE: NEGATIVE MG/DL
LDL CHOLESTEROL CALCULATED: 87 MG/DL
LEUKOCYTE ESTERASE, URINE: NEGATIVE
LYMPHOCYTES ABSOLUTE: 0.9 K/UL (ref 1–5.1)
LYMPHOCYTES RELATIVE PERCENT: 29.9 %
MCH RBC QN AUTO: 36 PG (ref 26–34)
MCHC RBC AUTO-ENTMCNC: 34.6 G/DL (ref 31–36)
MCV RBC AUTO: 104.1 FL (ref 80–100)
MICROSCOPIC EXAMINATION: NORMAL
MONOCYTES ABSOLUTE: 0.3 K/UL (ref 0–1.3)
MONOCYTES RELATIVE PERCENT: 10.2 %
NEUTROPHILS ABSOLUTE: 1.6 K/UL (ref 1.7–7.7)
NEUTROPHILS RELATIVE PERCENT: 54.9 %
NITRITE, URINE: NEGATIVE
PDW BLD-RTO: 12.1 % (ref 12.4–15.4)
PH UA: 6 (ref 5–8)
PHOSPHORUS: 4.8 MG/DL (ref 2.5–4.9)
PLATELET # BLD: 210 K/UL (ref 135–450)
PMV BLD AUTO: 7.7 FL (ref 5–10.5)
POTASSIUM SERPL-SCNC: 4.9 MMOL/L (ref 3.5–5.1)
PROTEIN UA: NEGATIVE MG/DL
RBC # BLD: 3.94 M/UL (ref 4–5.2)
SODIUM BLD-SCNC: 140 MMOL/L (ref 136–145)
SPECIFIC GRAVITY UA: 1.01 (ref 1–1.03)
TOTAL PROTEIN: 6.9 G/DL (ref 6.4–8.2)
TRIGL SERPL-MCNC: 56 MG/DL (ref 0–150)
TSH SERPL DL<=0.05 MIU/L-ACNC: 1.98 UIU/ML (ref 0.27–4.2)
URINE REFLEX TO CULTURE: NORMAL
URINE TYPE: NORMAL
UROBILINOGEN, URINE: 0.2 E.U./DL
VITAMIN D 25-HYDROXY: 60.3 NG/ML
VLDLC SERPL CALC-MCNC: 11 MG/DL
WBC # BLD: 3 K/UL (ref 4–11)

## 2022-03-29 PROCEDURE — 82306 VITAMIN D 25 HYDROXY: CPT

## 2022-03-29 PROCEDURE — 80076 HEPATIC FUNCTION PANEL: CPT

## 2022-03-29 PROCEDURE — 80069 RENAL FUNCTION PANEL: CPT

## 2022-03-29 PROCEDURE — 80061 LIPID PANEL: CPT

## 2022-03-29 PROCEDURE — 81003 URINALYSIS AUTO W/O SCOPE: CPT

## 2022-03-29 PROCEDURE — 85025 COMPLETE CBC W/AUTO DIFF WBC: CPT

## 2022-03-29 PROCEDURE — 84443 ASSAY THYROID STIM HORMONE: CPT

## 2022-03-29 PROCEDURE — 36415 COLL VENOUS BLD VENIPUNCTURE: CPT

## 2022-03-29 PROCEDURE — 87086 URINE CULTURE/COLONY COUNT: CPT

## 2022-03-30 LAB — URINE CULTURE, ROUTINE: NORMAL

## 2022-06-09 ENCOUNTER — HOSPITAL ENCOUNTER (OUTPATIENT)
Age: 62
Discharge: HOME | End: 2022-06-09
Payer: COMMERCIAL

## 2022-06-09 DIAGNOSIS — E83.119 HEMOCHROMATOSIS, UNSPECIFIED HEMOCHROMATOSIS TYPE: ICD-10-CM

## 2022-06-09 DIAGNOSIS — D72.818 OTHER DECREASED WHITE BLOOD CELL (WBC) COUNT: ICD-10-CM

## 2022-06-09 LAB
BASOPHILS ABSOLUTE: 0 K/UL (ref 0–0.2)
BASOPHILS RELATIVE PERCENT: 1.2 %
EOSINOPHILS ABSOLUTE: 0.1 K/UL (ref 0–0.6)
EOSINOPHILS RELATIVE PERCENT: 3.2 %
FERRITIN: 22.1 NG/ML (ref 15–150)
HCT VFR BLD CALC: 38.8 % (ref 36–48)
HEMOGLOBIN: 13.6 G/DL (ref 12–16)
IRON SATURATION: 43 % (ref 15–50)
IRON: 119 UG/DL (ref 37–145)
LYMPHOCYTES ABSOLUTE: 1.2 K/UL (ref 1–5.1)
LYMPHOCYTES RELATIVE PERCENT: 29.6 %
MCH RBC QN AUTO: 37.1 PG (ref 26–34)
MCHC RBC AUTO-ENTMCNC: 34.9 G/DL (ref 31–36)
MCV RBC AUTO: 106.1 FL (ref 80–100)
MONOCYTES ABSOLUTE: 0.5 K/UL (ref 0–1.3)
MONOCYTES RELATIVE PERCENT: 12.6 %
NEUTROPHILS ABSOLUTE: 2.1 K/UL (ref 1.7–7.7)
NEUTROPHILS RELATIVE PERCENT: 53.4 %
PDW BLD-RTO: 12.2 % (ref 12.4–15.4)
PLATELET # BLD: 212 K/UL (ref 135–450)
PMV BLD AUTO: 7.8 FL (ref 5–10.5)
RBC # BLD: 3.66 M/UL (ref 4–5.2)
TOTAL IRON BINDING CAPACITY: 276 UG/DL (ref 260–445)
WBC # BLD: 4 K/UL (ref 4–11)

## 2022-06-09 PROCEDURE — 83550 IRON BINDING TEST: CPT

## 2022-06-09 PROCEDURE — 36415 COLL VENOUS BLD VENIPUNCTURE: CPT

## 2022-06-09 PROCEDURE — 85025 COMPLETE CBC W/AUTO DIFF WBC: CPT

## 2022-06-09 PROCEDURE — 83540 ASSAY OF IRON: CPT

## 2022-06-09 PROCEDURE — 82728 ASSAY OF FERRITIN: CPT

## 2022-07-29 ENCOUNTER — HOSPITAL ENCOUNTER (OUTPATIENT)
Dept: ULTRASOUND IMAGING | Age: 62
Discharge: HOME | End: 2022-07-29
Payer: COMMERCIAL

## 2022-07-29 DIAGNOSIS — E83.110 HEMOCHROMATOSIS, HEREDITARY (HCC): ICD-10-CM

## 2022-07-29 PROCEDURE — 76700 US EXAM ABDOM COMPLETE: CPT

## 2022-09-18 DIAGNOSIS — M81.0 OSTEOPOROSIS, POSTMENOPAUSAL: Primary | ICD-10-CM

## 2022-09-18 DIAGNOSIS — I10 ESSENTIAL HYPERTENSION: ICD-10-CM

## 2022-09-19 RX ORDER — HYDROCHLOROTHIAZIDE 25 MG/1
TABLET ORAL
Qty: 90 TABLET | Refills: 4 | Status: SHIPPED | OUTPATIENT
Start: 2022-09-19

## 2022-09-19 RX ORDER — ALENDRONATE SODIUM 70 MG/1
70 TABLET ORAL WEEKLY
Qty: 12 TABLET | Refills: 4 | Status: SHIPPED | OUTPATIENT
Start: 2022-09-19

## 2022-09-19 NOTE — TELEPHONE ENCOUNTER
Medication:   Requested Prescriptions     Pending Prescriptions Disp Refills    alendronate (FOSAMAX) 70 MG tablet 12 tablet 4     Sig: Take 1 tablet by mouth once a week       Last Filled:      Patient Phone Number: [phone] (home)     Last appt: 9/14/2021   Next appt: 10/10/22    Last Labs DM: No results found for: LABA1C  Last Lipid:   Lab Results   Component Value Date/Time    CHOL 201 03/29/2022 07:03 AM    TRIG 56 03/29/2022 07:03 AM     03/29/2022 07:03 AM    HDL 85 01/25/2011 07:25 AM    LDLCALC 87 03/29/2022 07:03 AM     Last PSA: No results found for: PSA  Last Thyroid:   Lab Results   Component Value Date/Time    TSH 1.98 03/29/2022 07:03 AM    V8DDLYU 0.82 03/14/2012 04:44 PM    I1CROSL 0.82 03/14/2012 04:44 PM    T4FREE 0.93 03/14/2012 04:44 PM    T4FREE 0.93 03/14/2012 04:44 PM

## 2022-09-19 NOTE — TELEPHONE ENCOUNTER
Medication:   Requested Prescriptions     Pending Prescriptions Disp Refills    hydroCHLOROthiazide (HYDRODIURIL) 25 MG tablet 90 tablet 3     Sig: TAKE ONE TABLET BY MOUTH EVERY MORNING       Last Filled:      Patient Phone Number: [phone] (home)     Last appt: 9/14/2021   Next appt: 10/10/22    Last Labs DM: No results found for: LABA1C  Last Lipid:   Lab Results   Component Value Date/Time    CHOL 201 03/29/2022 07:03 AM    TRIG 56 03/29/2022 07:03 AM     03/29/2022 07:03 AM    HDL 85 01/25/2011 07:25 AM    LDLCALC 87 03/29/2022 07:03 AM     Last PSA: No results found for: PSA  Last Thyroid:   Lab Results   Component Value Date/Time    TSH 1.98 03/29/2022 07:03 AM    R4HQQAU 0.82 03/14/2012 04:44 PM    F4SOCMX 0.82 03/14/2012 04:44 PM    T4FREE 0.93 03/14/2012 04:44 PM    T4FREE 0.93 03/14/2012 04:44 PM

## 2022-09-28 ENCOUNTER — TELEPHONE (OUTPATIENT)
Dept: CARDIOLOGY CLINIC | Age: 62
End: 2022-09-28
Payer: COMMERCIAL

## 2022-09-28 ENCOUNTER — TELEPHONE (OUTPATIENT)
Dept: CARDIOLOGY CLINIC | Age: 62
End: 2022-09-28

## 2022-09-28 DIAGNOSIS — R00.1 BRADYCARDIA: Primary | ICD-10-CM

## 2022-09-28 PROCEDURE — 93000 ELECTROCARDIOGRAM COMPLETE: CPT | Performed by: INTERNAL MEDICINE

## 2022-09-28 NOTE — TELEPHONE ENCOUNTER
Patient complains of low heart rate (40) on Sept 18 while driving,  after having covid sept 7 (treated with paxlovid) will see in office ekg ordered

## 2022-09-29 ENCOUNTER — OFFICE VISIT (OUTPATIENT)
Dept: CARDIOLOGY CLINIC | Age: 62
End: 2022-09-29
Payer: COMMERCIAL

## 2022-09-29 VITALS
BODY MASS INDEX: 17.43 KG/M2 | DIASTOLIC BLOOD PRESSURE: 80 MMHG | SYSTOLIC BLOOD PRESSURE: 140 MMHG | HEART RATE: 75 BPM | OXYGEN SATURATION: 97 % | HEIGHT: 68 IN | WEIGHT: 115 LBS

## 2022-09-29 DIAGNOSIS — I47.1 SVT (SUPRAVENTRICULAR TACHYCARDIA) (HCC): ICD-10-CM

## 2022-09-29 DIAGNOSIS — M81.0 OSTEOPOROSIS, UNSPECIFIED OSTEOPOROSIS TYPE, UNSPECIFIED PATHOLOGICAL FRACTURE PRESENCE: ICD-10-CM

## 2022-09-29 DIAGNOSIS — I10 ESSENTIAL HYPERTENSION: ICD-10-CM

## 2022-09-29 DIAGNOSIS — E83.110 HEREDITARY HEMOCHROMATOSIS (HCC): ICD-10-CM

## 2022-09-29 DIAGNOSIS — U07.1 COVID-19: ICD-10-CM

## 2022-09-29 DIAGNOSIS — R00.1 BRADYCARDIA: Primary | ICD-10-CM

## 2022-09-29 PROCEDURE — 93242 EXT ECG>48HR<7D RECORDING: CPT | Performed by: INTERNAL MEDICINE

## 2022-09-29 PROCEDURE — 93227 XTRNL ECG REC<48 HR R&I: CPT | Performed by: INTERNAL MEDICINE

## 2022-09-29 PROCEDURE — 93225 XTRNL ECG REC<48 HRS REC: CPT | Performed by: INTERNAL MEDICINE

## 2022-09-29 PROCEDURE — 99204 OFFICE O/P NEW MOD 45 MIN: CPT | Performed by: INTERNAL MEDICINE

## 2022-09-29 PROCEDURE — 93000 ELECTROCARDIOGRAM COMPLETE: CPT | Performed by: INTERNAL MEDICINE

## 2022-09-29 NOTE — PROGRESS NOTES
[address]  10/4/22  Referring:     REASON FOR CONSULT/CHIEF COMPLAINT/HPI     Reason for visit/ Chief complaint  New Patient  Bradycardia    HPI Luli Bias is a 60 y.o. new patient who had COVID about 3 weeks ago and treated with Paxlovid. Patient noticed heart rate running around 40 bpm while driving on Sept 12EH. She has a history of HTN, Osteoporosis, hemochromatosis. She is a never smoker, drinks alcohol on occasion, no drug use history. Dad and sister have hemochromatosis as well. Mother has Afib, MR. No issues with pregnancy. Today she she feels \"okay\". Gets a \"strange feeling\" and then follows with a dry cough for a while. After COVID she felt recovered other than a vertigo feeling about 5 days after. Over the past weekend she started coughing and her heart rate was 40bmp. Later it was irregular. No other symptoms. Denies CP, SOB, fatigue, weakness, sleeps good. She does not do regular exercise, but is active with work and caring for mom. Told she snores at times, but not often. Patient is adherent with medications and is tolerating them well without side effects     HISTORY/ALLERGIES/ROS     MedHx:  has a past medical history of Hemochromatosis, Hypertension, and Osteoporosis. SurgHx:  has a past surgical history that includes liver biopsy; Colonoscopy; bone marrow biopsy; and Hemorrhoid surgery. SocHx:  reports that she has never smoked. She has never used smokeless tobacco. She reports current alcohol use of about 1.0 standard drink per week. She reports that she does not use drugs. FamHx: No family history of premature coronary artery disease, sudden death, or aneurysm  Allergies: Patient has no known allergies. ROS:   Review of Systems   Constitutional:  Positive for fatigue. Negative for activity change, diaphoresis and fever. HENT:  Negative for congestion and ear discharge. Eyes:  Negative for photophobia and visual disturbance.    Respiratory:  Positive for Yes Historical Provider, MD   metroNIDAZOLE (METROCREAM) 0.75 % cream Apply to face bid. Patient not taking: No sig reported 8/7/17   Dewayne Easton MD       PHYSICAL EXAM        Vitals:    09/29/22 0747   BP: (!) 140/80   Pulse: 75   SpO2: 97%    Weight: 115 lb (52.2 kg)     Gen Alert, cooperative, no distress Heart  Regular rate and rhythm, no murmur   Head Normocephalic, atraumatic, no abnormalities Abd  Soft, NT, +BS, no mass, no OM   Eyes PERRLA, conj/corn clear Ext  Ext nl, AT, no C/C, no edema   Nose Nares normal, no drain age, Non-tender Pulse 2+ and symmetric   Throat Lips, mucosa, tongue normal Skin Color/text/turg nl, no rash/lesions   Neck S/S, TM, NT, no bruit Psych Nl mood and affect   Lung CTA-B, unlabored, no DTP     Ch wall NT, no deform       LABS and Imaging     Relevant and available CV data reviewed  Echo/MRI: none    Cath: none   Holter:none   EKG personally interpreted: 9/29/22  Sinus rhythm, PVcs  Stress:none   Moderate Risk  Moderate Complexity/Medical Decision Making  Outside/Care everywhere records Reviewed  Labs Reviewed  Prior Imaging, ekg, cath, echo reviewed when available  Medications reviewed  Old Notes reviewed  ASSESSMENT AND PLAN     1. Bradycardia s/p COVID   Treated with paxlovid   9/18/22- 40bpm while driving  - new problem  Plan:  - renal and mag  - echocardiogram  - holter monitor  - consider cardiac CTA if negative evaluation    2. Essential hypertension  -stable   plan  Continue Hctz 25mg daily, cozaar 50mg BID     3. Hemochromatosis  - stable  - no known cardiac involvement  Plan:  -echocardiogram to evaluate    4. Osteoporosis  - stable  Plan:  - continue fosamax      Patient counseled on lifestyle modification, diet, and exercise. Follow Up: after testing     Dr. Betina Perez: This note was scribed in the presence of Dr. Kaden Truong by Birdie Naik RN.     Physician Attestation  The scribe for and in the presence of me Abhinav Jalloh DO). The scribe Marcio Painting RN  may have prepopulated components of this note with my historical  intellectual property under my direct supervision. Any additions to this intellectual property were performed in my presence and at my direction. Furthermore, the content and accuracy of this note have been reviewed by me Abhinav Jalloh DO).   10/4/2022 8:48 PM

## 2022-09-29 NOTE — LETTER
[address] [place] [address] Cardiology [place] [address] [address] [id number]  Phone: [phone]  Fax: [address], DO    October 4, 2022     Dao Aviles, Rosemary Edna [address]. [address] [facility]    Patient: Mariella Ledesma   MR Number: [id number]   YOB: 1960   Date of Visit: 9/29/2022       Dear Dao Aviles:    Thank you for referring Neel Shen to me for evaluation/treatment. Below are the relevant portions of my assessment and plan of care. If you have questions, please do not hesitate to call me. I look forward to following Asif Herrera along with you.     Sincerely,      Heraclio Tellez, DO

## 2022-09-30 DIAGNOSIS — R00.1 BRADYCARDIA: ICD-10-CM

## 2022-09-30 LAB
ALBUMIN SERPL-MCNC: 4.8 G/DL (ref 3.4–5)
ANION GAP SERPL CALCULATED.3IONS-SCNC: 12 MMOL/L (ref 3–16)
BUN BLDV-MCNC: 9 MG/DL (ref 7–20)
CALCIUM SERPL-MCNC: 9.5 MG/DL (ref 8.3–10.6)
CHLORIDE BLD-SCNC: 103 MMOL/L (ref 99–110)
CO2: 28 MMOL/L (ref 21–32)
CREAT SERPL-MCNC: 0.6 MG/DL (ref 0.6–1.2)
GFR AFRICAN AMERICAN: >60
GFR NON-AFRICAN AMERICAN: >60
GLUCOSE BLD-MCNC: 78 MG/DL (ref 70–99)
MAGNESIUM: 2.3 MG/DL (ref 1.8–2.4)
PHOSPHORUS: 4.1 MG/DL (ref 2.5–4.9)
POTASSIUM SERPL-SCNC: 4.1 MMOL/L (ref 3.5–5.1)
SODIUM BLD-SCNC: 143 MMOL/L (ref 136–145)

## 2022-10-04 ASSESSMENT — ENCOUNTER SYMPTOMS
NAUSEA: 0
ABDOMINAL PAIN: 0
ABDOMINAL DISTENTION: 0
BLOOD IN STOOL: 0
COUGH: 1
CHEST TIGHTNESS: 0
SHORTNESS OF BREATH: 0
PHOTOPHOBIA: 0

## 2022-10-05 DIAGNOSIS — I47.1 SVT (SUPRAVENTRICULAR TACHYCARDIA) (HCC): Primary | ICD-10-CM

## 2022-10-05 DIAGNOSIS — R94.31 EKG ABNORMALITIES: ICD-10-CM

## 2022-10-07 ENCOUNTER — TELEPHONE (OUTPATIENT)
Dept: CARDIOLOGY CLINIC | Age: 62
End: 2022-10-07

## 2022-10-07 NOTE — TELEPHONE ENCOUNTER
Placed 14 day MCOT    Educated patient understood,will call [facility] if any questions or problems over weekend.     MONITOR S/N : [id number]

## 2022-10-10 ENCOUNTER — HOSPITAL ENCOUNTER (OUTPATIENT)
Dept: GENERAL RADIOLOGY | Age: 62
Discharge: HOME | End: 2022-10-10
Payer: COMMERCIAL

## 2022-10-10 ENCOUNTER — OFFICE VISIT (OUTPATIENT)
Dept: ENDOCRINOLOGY | Age: 62
End: 2022-10-10
Payer: COMMERCIAL

## 2022-10-10 ENCOUNTER — PROCEDURE VISIT (OUTPATIENT)
Dept: ENDOCRINOLOGY | Age: 62
End: 2022-10-10

## 2022-10-10 ENCOUNTER — PROCEDURE VISIT (OUTPATIENT)
Dept: CARDIOLOGY CLINIC | Age: 62
End: 2022-10-10
Payer: COMMERCIAL

## 2022-10-10 VITALS
BODY MASS INDEX: 17.4 KG/M2 | SYSTOLIC BLOOD PRESSURE: 118 MMHG | DIASTOLIC BLOOD PRESSURE: 66 MMHG | HEIGHT: 68 IN | WEIGHT: 114.8 LBS

## 2022-10-10 DIAGNOSIS — R82.994 HYPERCALCIURIA: ICD-10-CM

## 2022-10-10 DIAGNOSIS — M81.0 OSTEOPOROSIS, POSTMENOPAUSAL: Primary | ICD-10-CM

## 2022-10-10 DIAGNOSIS — Z51.81 MEDICATION MONITORING ENCOUNTER: ICD-10-CM

## 2022-10-10 DIAGNOSIS — M81.0 OSTEOPOROSIS, POSTMENOPAUSAL: Primary | Chronic | ICD-10-CM

## 2022-10-10 DIAGNOSIS — R00.1 BRADYCARDIA: ICD-10-CM

## 2022-10-10 DIAGNOSIS — M81.0 OSTEOPOROSIS, POSTMENOPAUSAL: ICD-10-CM

## 2022-10-10 LAB
LV EF: 58 %
LVEF MODALITY: NORMAL

## 2022-10-10 PROCEDURE — 99214 OFFICE O/P EST MOD 30 MIN: CPT | Performed by: INTERNAL MEDICINE

## 2022-10-10 PROCEDURE — 77080 DXA BONE DENSITY AXIAL: CPT

## 2022-10-10 PROCEDURE — 77080 DXA BONE DENSITY AXIAL: CPT | Performed by: INTERNAL MEDICINE

## 2022-10-10 PROCEDURE — 93306 TTE W/DOPPLER COMPLETE: CPT | Performed by: INTERNAL MEDICINE

## 2022-10-10 NOTE — PROGRESS NOTES
[address]  Osteoporosis and [address]  [place] [address], [address]  Phone [phone]  Fax [phone]    NAME:  Lory Kent  : 1960  CONSULT DATE: 2013  MOST RECENT VISIT: 2021  TODAY'S DATE: 10/10/2022    Labs @ [facility] 2022    PROBLEMS. Osteoporosis by DXA 10/2012, lowest T-score -2.6 in the left femoral neck  Natural menopause age ~53 (~)  Hemochromatosis diagnosed   Hypertension  Hypercalciuria, normal 24-h urine calcium for her is 100-220 mg/d    293 mg/d 2016 on no treatment    296 mg/d 2016 with HCTZ 12.5 mg/d    136 mg/d 2016 with HCTZ 25 mg/d  Hypokalemia, 2016 K 3.4    CURRENT MANAGEMENT FOR OSTEOPOROSIS. Calcium, diet 550 mg/d , supplements cause constipation    250 mg/d milk, 300 mg/d low Ca foods  Vitamin D, 2000 IU/d    25-OH D 60 ng/mL 2021 with no supplement  Exercise, nothing regular. Pharmacologic therapy, alendronate 70 mg weekly started 2020    PREVIOUS MEDICATIONS FOR OSTEOPOROSIS. None    OTHER CURRENT MEDICATIONS. Effexor, losartan, folic acid  OTC MEDICATIONS. Colace, biotin, niacin    CHIEF COMPLAINT. Here for f/u of osteoporosis, hypercalciuria, monitoring treatment. No new related signs or symptoms. INTERVAL HISTORY. See problem list for chronic/inactive conditions. She has been taking HCTZ and alendronate correctly and without side effects. No falls, near-falls or fractures. Feels well overall. FOR FULL DETAILS OF FAMILY HISTORY, PAST MEDICAL AND SURGICAL HISTORY, SOCIAL HISTORY, AND REVIEW OF SYSTEMS, SEE PATIENT QUESTIONNAIRE OF TODAY'S DATE. PHYSICAL EXAMINATION. GENERAL. Well-nourished, well-developed, normally proportioned adult. MENTAL STATUS. Pleasant mood. Oriented to time, place, and person. MUSCULOSKELETAL. Spinal contours are normal.  No spine tenderness to palpation or percussion. Three finger spaces between ribs and pelvis.   Gait steady without assistance. NEUROLOGICAL. Able to rise from chair without using arms. No apparent focal motor or sensory deficit. Coordination appears normal.     BONE DENSITY. Most recent done here using Hologic equipment. T-scores  Initial study: 10/11/2012 L1-L4 -2.2 left fem. neck -2.6   Current study: 10/10/2022 L1-L4 -1.8 left fem. neck -3.0     The table below shows bone mineral density (grams/cm2), the appropriate measure for comparing serial scans. A significant increase or decrease is based on precision studies done at our center according to the ISCD protocol with a least significant change of 0.030 g/cm2. PA spine Proximal Femur (left)   Date L1-L4 Fem. neck Trochanter Total hip   10/11/2012 0.800 0.560 0.561 ---   05/25/2018 0.766 0.524 0.526 0.697   08/03/2020 0.773 0.501 0.508 0.685   09/14/2021 0.830 0.512 0.513 0.700   10/10/2022 0.852 0.521 0.522 0.695     IMPRESSION:  BONE DENSITY IS LOW, CONSISTENT WITH OSTEOPOROSIS. SINCE THE LAST DXA, BMD INCREASED IN THE SPINE AND IS TRENDING UP IN THE FEMORAL NECK. Labs: 11/2012, CBC, TSH, CMP. 08/2015, Ca 9.7, Cr 0.7. 02/2016 . 03/2016 Ca 9.3 Cr 0.7.  08/2016 Ca 9.4 Cr 0.5 PTH 31, 1,25 D 62 (20-79). 09/2016 Ca 9.1 Cr 0.7 K 3.4. 09/2017 Ca 9.9 Cr 0.5.  03/2019 Ca 9.7 Cr 0.6. 03/2020 Ca 9.9 Cr 0.6. 03/2021 Ca 9.4 Cr 0.6. 03/2022 Ca 9.4 Cr 0.6. X-rays viewed: DXA printouts reviewed. 12/2013 abd CT.    ASSESSMENT. Osteoporosis, bone density lower than desirable. BMD decreased substantially 3130-6785 but was stable/increased 4516-7614. Femoral neck BMD decreased 3370-7846. She is doing well with alendronate started 08/2020. 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 PLANS. HCTZ 25 mg/d and alendronate 70 mg weekly. Return appointment with DXA in 1 year. Time spent today: 30-39 minutes. Charlee Beal MD, Director, [facility]) Osteoporosis and Bone Health Services    CC: Daniel Hurd MD

## 2022-10-10 NOTE — RESULT ENCOUNTER NOTE
[facility] ([facility]) Osteoporosis and [address] [facility] [address] [facility], [address] [place] [address],[place] [address]  Phone [phone]  Fax [phone]    NAME: Manda Weir   : 1960   STUDY DATE: 10/10/2022     REFERRING PROVIDER: Shanique Felix MD    INDICATION(S) FOR PERFORMING THE STUDY:  osteoporosis, age-related (M81.0)    CLINICAL INFORMATION PROVIDED BY THE PATIENT: 78-year-old woman. Natural menopause was age 45. No history of fragility fractures. No long-term corticosteroid use. Current treatment is alendronate started 2020. EQUIPMENT: Hologic Discovery. POSITIONING: Good. REGIONS OF INTEREST: Correct. ARTIFACTS: None. STUDY VALID? Yes. Spine BMD is spuriously high because of generalized degenerative change;  no adjustments were made. T-scores  Initial study: 10/11/2012 L1-L4 -2.2 left fem. neck -2.6   Current study: 10/10/2022 L1-L4 -1.8 left fem. neck -3.0     The table below shows bone mineral density (grams/cm2), the appropriate measure for comparing serial scans. A significant increase or decrease is based on precision studies done at our center according to the ISCD protocol with a least significant change of 0.030 g/cm2. PA spine Proximal Femur (left)   Date L1-L4 Fem. neck Trochanter Total hip   10/11/2012 0.800 0.560 0.561 ---   2018 0.766 0.524 0.526 0.697   2020 0.773 0.501 0.508 0.685   2021 0.830 0.512 0.513 0.700   10/10/2022 0.852 0.521 0.522 0.695     IMPRESSION:  BONE DENSITY IS LOW, CONSISTENT WITH OSTEOPOROSIS. SINCE THE LAST DXA, BMD INCREASED IN THE SPINE AND IS TRENDING UP IN THE FEMORAL NECK. Consider repeating this study in 1-2 years to assess the patient's progress. _________________________________________________   Griffin Beal MD, Director, [facility] ([facility]) Osteoporosis and [address] [place] [address]

## 2022-10-11 DIAGNOSIS — I25.9 CHEST PAIN DUE TO MYOCARDIAL ISCHEMIA, UNSPECIFIED ISCHEMIC CHEST PAIN TYPE: ICD-10-CM

## 2022-10-11 DIAGNOSIS — I25.9 CHEST PAIN DUE TO MYOCARDIAL ISCHEMIA, UNSPECIFIED ISCHEMIC CHEST PAIN TYPE: Primary | ICD-10-CM

## 2022-10-11 DIAGNOSIS — I31.39 PERICARDIAL EFFUSION: ICD-10-CM

## 2022-10-11 DIAGNOSIS — R00.2 PALPITATIONS: ICD-10-CM

## 2022-10-12 LAB
ANTI-NUCLEAR ANTIBODY (ANA): NEGATIVE
C-REACTIVE PROTEIN: <3 MG/L (ref 0–5.1)
PRO-BNP: 102 PG/ML (ref 0–124)
TSH REFLEX: 1.62 UIU/ML (ref 0.27–4.2)

## 2022-10-13 PROCEDURE — 93244 EXT ECG>48HR<7D REV&INTERPJ: CPT | Performed by: INTERNAL MEDICINE

## 2022-10-13 NOTE — RESULT ENCOUNTER NOTE
Discussed with Yamile. Labs normal. Obtain coronary cta to evaluate for CAD and 14 day monitor to evaluate for afib vs SVT. Discussed starting anticoagulation vs monitoring, patient would prefer to start aspirin at this time given low scdis2sicx score. We discussed stroke risk.

## 2022-11-02 ENCOUNTER — HOSPITAL ENCOUNTER (OUTPATIENT)
Dept: CT IMAGING | Age: 62
Discharge: HOME | End: 2022-11-02
Payer: COMMERCIAL

## 2022-11-02 VITALS
RESPIRATION RATE: 18 BRPM | HEART RATE: 60 BPM | HEIGHT: 68 IN | DIASTOLIC BLOOD PRESSURE: 68 MMHG | WEIGHT: 116 LBS | OXYGEN SATURATION: 99 % | BODY MASS INDEX: 17.58 KG/M2 | TEMPERATURE: 97.9 F | SYSTOLIC BLOOD PRESSURE: 125 MMHG

## 2022-11-02 DIAGNOSIS — I25.9 CHEST PAIN DUE TO MYOCARDIAL ISCHEMIA, UNSPECIFIED ISCHEMIC CHEST PAIN TYPE: ICD-10-CM

## 2022-11-02 DIAGNOSIS — I31.39 PERICARDIAL EFFUSION: ICD-10-CM

## 2022-11-02 DIAGNOSIS — R00.2 PALPITATIONS: ICD-10-CM

## 2022-11-02 PROCEDURE — 6370000000 HC RX 637 (ALT 250 FOR IP): Performed by: RADIOLOGY

## 2022-11-02 PROCEDURE — 6360000004 HC RX CONTRAST MEDICATION: Performed by: INTERNAL MEDICINE

## 2022-11-02 PROCEDURE — 75574 CT ANGIO HRT W/3D IMAGE: CPT

## 2022-11-02 RX ORDER — NITROGLYCERIN 0.4 MG/1
0.4 TABLET SUBLINGUAL ONCE
Status: COMPLETED | OUTPATIENT
Start: 2022-11-02 | End: 2022-11-02

## 2022-11-02 RX ADMIN — NITROGLYCERIN 0.4 MG: 0.4 TABLET, ORALLY DISINTEGRATING SUBLINGUAL at 09:21

## 2022-11-02 RX ADMIN — IOPAMIDOL 85 ML: 755 INJECTION, SOLUTION INTRAVENOUS at 09:13

## 2022-11-02 ASSESSMENT — PAIN - FUNCTIONAL ASSESSMENT: PAIN_FUNCTIONAL_ASSESSMENT: NONE - DENIES PAIN

## 2022-11-02 NOTE — PROGRESS NOTES
Procedure complete, pt tolerated well. PIV removed without complication. Written and verbal discharge instructions given to pt.   Pt verbalizes understanding

## 2023-01-18 DIAGNOSIS — I31.39 PERICARDIAL EFFUSION: Primary | ICD-10-CM

## 2023-02-06 ENCOUNTER — CLINICAL DOCUMENTATION (OUTPATIENT)
Dept: CARDIOLOGY | Age: 63
End: 2023-02-06

## 2023-02-11 ENCOUNTER — HOSPITAL ENCOUNTER (OUTPATIENT)
Dept: WOMENS IMAGING | Age: 63
Discharge: HOME | End: 2023-02-11
Payer: COMMERCIAL

## 2023-02-11 VITALS — WEIGHT: 117 LBS | HEIGHT: 68 IN | BODY MASS INDEX: 17.73 KG/M2

## 2023-02-11 DIAGNOSIS — Z12.31 VISIT FOR SCREENING MAMMOGRAM: ICD-10-CM

## 2023-02-11 PROCEDURE — 77067 SCR MAMMO BI INCL CAD: CPT

## 2023-04-21 ENCOUNTER — HOSPITAL ENCOUNTER (OUTPATIENT)
Dept: CARDIOLOGY | Age: 63
Discharge: HOME | End: 2023-04-21
Payer: COMMERCIAL

## 2023-04-21 DIAGNOSIS — I31.39 PERICARDIAL EFFUSION: ICD-10-CM

## 2023-04-21 LAB
LV EF: 58 %
LVEF MODALITY: NORMAL

## 2023-04-21 PROCEDURE — 93308 TTE F-UP OR LMTD: CPT

## 2023-05-05 ENCOUNTER — OFFICE VISIT (OUTPATIENT)
Dept: CARDIOLOGY CLINIC | Age: 63
End: 2023-05-05

## 2023-05-05 VITALS
BODY MASS INDEX: 17.73 KG/M2 | HEART RATE: 60 BPM | WEIGHT: 117 LBS | HEIGHT: 68 IN | SYSTOLIC BLOOD PRESSURE: 120 MMHG | OXYGEN SATURATION: 98 % | DIASTOLIC BLOOD PRESSURE: 60 MMHG

## 2023-05-05 DIAGNOSIS — E83.110 HEREDITARY HEMOCHROMATOSIS (HCC): ICD-10-CM

## 2023-05-05 DIAGNOSIS — I47.1 ATRIAL TACHYCARDIA (HCC): Primary | ICD-10-CM

## 2023-05-05 DIAGNOSIS — I10 ESSENTIAL HYPERTENSION: ICD-10-CM

## 2023-05-05 ASSESSMENT — ENCOUNTER SYMPTOMS
BLOOD IN STOOL: 0
SHORTNESS OF BREATH: 0
CHEST TIGHTNESS: 0
ABDOMINAL PAIN: 0
NAUSEA: 0
ABDOMINAL DISTENTION: 0
PHOTOPHOBIA: 0

## 2023-06-04 ASSESSMENT — ENCOUNTER SYMPTOMS: COUGH: 0

## 2023-06-26 ENCOUNTER — TELEPHONE (OUTPATIENT)
Dept: DERMATOLOGY | Age: 63
End: 2023-06-26

## 2023-09-05 ENCOUNTER — OFFICE VISIT (OUTPATIENT)
Dept: DERMATOLOGY | Age: 63
End: 2023-09-05

## 2023-09-05 DIAGNOSIS — D22.5 IRRITATED NEVUS OF BACK: Primary | ICD-10-CM

## 2023-09-05 DIAGNOSIS — D22.9 BENIGN NEVUS: ICD-10-CM

## 2023-09-05 DIAGNOSIS — L82.1 SEBORRHEIC KERATOSES: ICD-10-CM

## 2023-09-05 DIAGNOSIS — B35.1 ONYCHOMYCOSIS: ICD-10-CM

## 2023-09-05 NOTE — PROGRESS NOTES
sunscreen use (water resistant, broad spectrum, SPF at least 30, need for reapplication every 2 to 3 hours), avoidance of tanning beds      3. Seborrheic keratoses - Discussed underlying nature of seborrheic keratosis and low risk of malignancy. Treatment reserved for lesions that are itching, bleeding, growing or otherwise becoming bothersome. Discussed monitoring for change with reevaluation for changing lesions. 4. Onychomycosis -discussed chronic, recurrent nature-continue Jublia until nail has completely grown. Restart use for recurrence.

## 2023-09-15 LAB — DERMATOLOGY PATHOLOGY REPORT: NORMAL

## 2023-10-17 ENCOUNTER — HOSPITAL ENCOUNTER (OUTPATIENT)
Dept: GENERAL RADIOLOGY | Age: 63
Discharge: HOME | End: 2023-10-17
Payer: COMMERCIAL

## 2023-10-17 DIAGNOSIS — M81.0 OSTEOPOROSIS, POSTMENOPAUSAL: ICD-10-CM

## 2023-10-17 PROCEDURE — 77080 DXA BONE DENSITY AXIAL: CPT | Performed by: INTERNAL MEDICINE

## 2023-10-17 PROCEDURE — 77080 DXA BONE DENSITY AXIAL: CPT

## 2023-11-22 DIAGNOSIS — M81.0 OSTEOPOROSIS, POSTMENOPAUSAL: ICD-10-CM

## 2023-11-22 RX ORDER — ALENDRONATE SODIUM 70 MG/1
70 TABLET ORAL WEEKLY
Qty: 12 TABLET | Refills: 4 | OUTPATIENT
Start: 2023-11-22

## 2023-11-28 DIAGNOSIS — I10 ESSENTIAL HYPERTENSION: ICD-10-CM

## 2023-11-28 DIAGNOSIS — M81.0 OSTEOPOROSIS, POSTMENOPAUSAL: ICD-10-CM

## 2023-11-28 RX ORDER — HYDROCHLOROTHIAZIDE 25 MG/1
TABLET ORAL
Qty: 90 TABLET | Refills: 4 | Status: SHIPPED | OUTPATIENT
Start: 2023-11-28

## 2023-11-28 RX ORDER — ALENDRONATE SODIUM 70 MG/1
TABLET ORAL
Qty: 12 TABLET | Refills: 4 | Status: SHIPPED | OUTPATIENT
Start: 2023-11-28

## 2023-11-28 NOTE — TELEPHONE ENCOUNTER
Medication:   Requested Prescriptions     Pending Prescriptions Disp Refills    hydroCHLOROthiazide (HYDRODIURIL) 25 MG tablet [Pharmacy Med Name: HYDROCHLOROTHIAZIDE 25MG TABS] 90 tablet 4     Sig: TAKE ONE TABLET BY MOUTH EVERY MORNING    alendronate (FOSAMAX) 70 MG tablet [Pharmacy Med Name: ALENDRONATE SODIUM 70MG TABS] 12 tablet 4     Sig: TAKE ONE TABLET BY MOUTH ONCE WEEKLY BEFORE BREAKFAST WITH A FULL GLASS OF WATER, ON AN EMPTY STOMACH. REMAIN UPRIGHT FOR 30 MINUTES. Last Filled:      Patient Phone Number: [phone] (home)     Last appt: 10/10/2022   Next appt: dexa results on 10/17/23.  MyChart    Last Labs DM: No results found for: \"LABA1C\"  Last Lipid:   Lab Results   Component Value Date/Time    CHOL 180 04/24/2023 07:39 AM    TRIG 62 04/24/2023 07:39 AM    HDL 89 04/24/2023 07:39 AM    HDL 85 01/25/2011 07:25 AM    LDLCALC 79 04/24/2023 07:39 AM     Last PSA: No results found for: \"PSA\"  Last Thyroid:   Lab Results   Component Value Date/Time    TSH 1.74 04/24/2023 07:39 AM    C3WHLQA 0.82 03/14/2012 04:44 PM    J8HUCZN 0.82 03/14/2012 04:44 PM    T4FREE 0.93 03/14/2012 04:44 PM    T4FREE 0.93 03/14/2012 04:44 PM

## 2024-03-13 ENCOUNTER — HOSPITAL ENCOUNTER (OUTPATIENT)
Dept: WOMENS IMAGING | Age: 64
Discharge: HOME | End: 2024-03-13
Payer: COMMERCIAL

## 2024-03-13 ENCOUNTER — HOSPITAL ENCOUNTER (OUTPATIENT)
Age: 64
Discharge: HOME | End: 2024-03-13
Payer: COMMERCIAL

## 2024-03-13 VITALS — WEIGHT: 117 LBS | BODY MASS INDEX: 17.73 KG/M2 | HEIGHT: 68 IN

## 2024-03-13 DIAGNOSIS — Z12.31 VISIT FOR SCREENING MAMMOGRAM: ICD-10-CM

## 2024-03-13 LAB
25(OH)D3 SERPL-MCNC: 59.3 NG/ML
ALBUMIN SERPL-MCNC: 4.8 G/DL (ref 3.4–5)
ALP SERPL-CCNC: 61 U/L (ref 40–129)
ALT SERPL-CCNC: 17 U/L (ref 10–40)
ANION GAP SERPL CALCULATED.3IONS-SCNC: 12 MMOL/L (ref 3–16)
AST SERPL-CCNC: 21 U/L (ref 15–37)
BASOPHILS # BLD: 0 K/UL (ref 0–0.2)
BASOPHILS NFR BLD: 0.8 %
BILIRUB DIRECT SERPL-MCNC: <0.2 MG/DL (ref 0–0.3)
BILIRUB INDIRECT SERPL-MCNC: NORMAL MG/DL (ref 0–1)
BILIRUB SERPL-MCNC: 0.3 MG/DL (ref 0–1)
BILIRUB UR QL STRIP.AUTO: NEGATIVE
BUN SERPL-MCNC: 13 MG/DL (ref 7–20)
CALCIUM SERPL-MCNC: 9.5 MG/DL (ref 8.3–10.6)
CHLORIDE SERPL-SCNC: 99 MMOL/L (ref 99–110)
CHOLEST SERPL-MCNC: 225 MG/DL (ref 0–199)
CLARITY UR: CLEAR
CO2 SERPL-SCNC: 28 MMOL/L (ref 21–32)
COLOR UR: YELLOW
CREAT SERPL-MCNC: 0.6 MG/DL (ref 0.6–1.2)
DEPRECATED RDW RBC AUTO: 12.9 % (ref 12.4–15.4)
EOSINOPHIL # BLD: 0.1 K/UL (ref 0–0.6)
EOSINOPHIL NFR BLD: 2.7 %
GFR SERPLBLD CREATININE-BSD FMLA CKD-EPI: >60 ML/MIN/{1.73_M2}
GLUCOSE SERPL-MCNC: 82 MG/DL (ref 70–99)
GLUCOSE UR STRIP.AUTO-MCNC: NEGATIVE MG/DL
HCT VFR BLD AUTO: 38 % (ref 36–48)
HDLC SERPL-MCNC: 107 MG/DL (ref 40–60)
HGB BLD-MCNC: 13.3 G/DL (ref 12–16)
HGB UR QL STRIP.AUTO: NEGATIVE
KETONES UR STRIP.AUTO-MCNC: NEGATIVE MG/DL
LDLC SERPL CALC-MCNC: 107 MG/DL
LEUKOCYTE ESTERASE UR QL STRIP.AUTO: NEGATIVE
LYMPHOCYTES # BLD: 1 K/UL (ref 1–5.1)
LYMPHOCYTES NFR BLD: 30.9 %
MCH RBC QN AUTO: 36.6 PG (ref 26–34)
MCHC RBC AUTO-ENTMCNC: 35 G/DL (ref 31–36)
MCV RBC AUTO: 104.7 FL (ref 80–100)
MONOCYTES # BLD: 0.4 K/UL (ref 0–1.3)
MONOCYTES NFR BLD: 10.9 %
NEUTROPHILS # BLD: 1.8 K/UL (ref 1.7–7.7)
NEUTROPHILS NFR BLD: 54.7 %
NITRITE UR QL STRIP.AUTO: NEGATIVE
PH UR STRIP.AUTO: 7 [PH] (ref 5–8)
PHOSPHATE SERPL-MCNC: 4.4 MG/DL (ref 2.5–4.9)
PLATELET # BLD AUTO: 225 K/UL (ref 135–450)
PMV BLD AUTO: 8.3 FL (ref 5–10.5)
POTASSIUM SERPL-SCNC: 3.3 MMOL/L (ref 3.5–5.1)
PROT SERPL-MCNC: 7.2 G/DL (ref 6.4–8.2)
PROT UR STRIP.AUTO-MCNC: NEGATIVE MG/DL
RBC # BLD AUTO: 3.63 M/UL (ref 4–5.2)
SODIUM SERPL-SCNC: 139 MMOL/L (ref 136–145)
SP GR UR STRIP.AUTO: <=1.005 (ref 1–1.03)
TRIGL SERPL-MCNC: 54 MG/DL (ref 0–150)
TSH SERPL DL<=0.005 MIU/L-ACNC: 1.93 UIU/ML (ref 0.27–4.2)
UA COMPLETE W REFLEX CULTURE PNL UR: NORMAL
UA DIPSTICK W REFLEX MICRO PNL UR: NORMAL
URN SPEC COLLECT METH UR: NORMAL
UROBILINOGEN UR STRIP-ACNC: 0.2 E.U./DL
VLDLC SERPL CALC-MCNC: 11 MG/DL
WBC # BLD AUTO: 3.3 K/UL (ref 4–11)

## 2024-03-13 PROCEDURE — 82306 VITAMIN D 25 HYDROXY: CPT

## 2024-03-13 PROCEDURE — 36415 COLL VENOUS BLD VENIPUNCTURE: CPT

## 2024-03-13 PROCEDURE — 80069 RENAL FUNCTION PANEL: CPT

## 2024-03-13 PROCEDURE — 81003 URINALYSIS AUTO W/O SCOPE: CPT

## 2024-03-13 PROCEDURE — 80061 LIPID PANEL: CPT

## 2024-03-13 PROCEDURE — 84443 ASSAY THYROID STIM HORMONE: CPT

## 2024-03-13 PROCEDURE — 85025 COMPLETE CBC W/AUTO DIFF WBC: CPT

## 2024-03-13 PROCEDURE — 80076 HEPATIC FUNCTION PANEL: CPT

## 2024-03-13 PROCEDURE — 77063 BREAST TOMOSYNTHESIS BI: CPT

## 2024-06-05 NOTE — PROGRESS NOTES
diet, and exercise.    Follow Up: one year    Dr. Jaspreet Goldstein DO  Cardiologist [facility]    Scribe Attestation: This note was scribed in the presence of Dr. Goldstein by Rosio Harrison RN   RN.    Physician Attestation  The scribe for and in the presence of me (Vinny Goldstein DO).  The scribe Rosio Harrison RN   may have prepopulated components of this note with my historical  intellectual property under my direct supervision.  Any additions to this intellectual property were performed in my presence and at my direction.  Furthermore, the content and accuracy of this note have been reviewed by me (Vinny Goldstein DO).  6/12/2024 9:21 AM

## 2024-06-06 PROBLEM — I47.19 ATRIAL TACHYCARDIA (HCC): Status: ACTIVE | Noted: 2024-06-06

## 2024-06-07 ENCOUNTER — OFFICE VISIT (OUTPATIENT)
Dept: CARDIOLOGY CLINIC | Age: 64
End: 2024-06-07
Payer: COMMERCIAL

## 2024-06-07 VITALS
HEART RATE: 76 BPM | BODY MASS INDEX: 17.72 KG/M2 | DIASTOLIC BLOOD PRESSURE: 56 MMHG | SYSTOLIC BLOOD PRESSURE: 102 MMHG | OXYGEN SATURATION: 99 % | HEIGHT: 68 IN | WEIGHT: 116.9 LBS

## 2024-06-07 DIAGNOSIS — E83.110 HEREDITARY HEMOCHROMATOSIS (HCC): ICD-10-CM

## 2024-06-07 DIAGNOSIS — I31.39 PERICARDIAL EFFUSION: ICD-10-CM

## 2024-06-07 DIAGNOSIS — I10 ESSENTIAL HYPERTENSION: ICD-10-CM

## 2024-06-07 DIAGNOSIS — I47.19 ATRIAL TACHYCARDIA (HCC): Primary | ICD-10-CM

## 2024-06-07 PROCEDURE — 99214 OFFICE O/P EST MOD 30 MIN: CPT | Performed by: INTERNAL MEDICINE

## 2024-06-07 PROCEDURE — 3074F SYST BP LT 130 MM HG: CPT | Performed by: INTERNAL MEDICINE

## 2024-06-07 PROCEDURE — 3078F DIAST BP <80 MM HG: CPT | Performed by: INTERNAL MEDICINE

## 2024-06-07 PROCEDURE — 93000 ELECTROCARDIOGRAM COMPLETE: CPT | Performed by: INTERNAL MEDICINE

## 2024-06-07 RX ORDER — LORAZEPAM 1 MG/1
1 TABLET ORAL SEE ADMIN INSTRUCTIONS
Qty: 2 TABLET | Refills: 0 | Status: SHIPPED | OUTPATIENT
Start: 2024-06-07 | End: 2024-06-08

## 2024-06-07 RX ORDER — METOPROLOL SUCCINATE 25 MG/1
12.5 TABLET, EXTENDED RELEASE ORAL DAILY
Qty: 45 TABLET | Refills: 3 | Status: SHIPPED | OUTPATIENT
Start: 2024-06-07

## 2024-06-07 NOTE — PATIENT INSTRUCTIONS
Cardiac MRI- can take ativan one hour prior MRI and second one if needed  [employer]  Follow up with Dr Newby    Start metoprolol 12.5 mg daily  Decrease losartan to once a day  Follow up in 6months

## 2024-06-17 NOTE — PROGRESS NOTES
No significant coronary artery stenosis.  2. Total calcium score 0.  No identifiable atherosclerotic plaque.  3. Right coronary dominance.  4. No acute cardiopulmonary disease.  Hepatic steatosis.      I independently reviewed the cardiac diagnostic studies, ECG and relevant imaging studies.     Lab Results   Component Value Date    LVEF 58 04/21/2023     Lab Results   Component Value Date    TSHFT4 1.37 12/29/2020    TSH 1.93 03/13/2024       Physical Examination:  Vitals:    06/20/24 0726   BP: (!) 100/50   Pulse: 79   SpO2: 100%      Wt Readings from Last 3 Encounters:   06/20/24 52.7 kg (116 lb 3.2 oz)   06/07/24 53 kg (116 lb 14.4 oz)   03/13/24 53.1 kg (117 lb)       Constitutional: Oriented. No distress.   Head: Normocephalic and atraumatic.   Mouth/Throat: Oropharynx is clear and moist.   Eyes: Conjunctivae normal. EOM are normal.   Neck: Neck supple. No rigidity. No JVD present.    Cardiovascular: Normal rate, regular rhythm, S1&S2.    Pulmonary/Chest: Bilateral respiratory sounds. No wheezes, No rhonchi.    Abdominal: Soft. Bowel sounds present. No distension, No tenderness.   Musculoskeletal: No tenderness. No edema    Lymphadenopathy: Has no cervical adenopathy.   Neurological: Alert and oriented. Cranial nerve appears intact, No Gross deficit   Skin: Skin is warm and dry. No rash noted.   Psychiatric: Has a normal behavior       Review of System:  [x] Full ROS obtained and negative except as mentioned in HPI    Prior to Admission medications    Medication Sig Start Date End Date Taking? Authorizing Provider   aspirin 81 MG EC tablet Take 1 tablet by mouth daily   Yes ProviderEzequiel MD   metoprolol succinate (TOPROL XL) 25 MG extended release tablet Take 0.5 tablets by mouth daily 6/7/24  Yes Vinny Goldstein DO   hydroCHLOROthiazide (HYDRODIURIL) 25 MG tablet TAKE ONE TABLET BY MOUTH EVERY MORNING 11/28/23  Yes Toni Beal MD   alendronate (FOSAMAX) 70 MG tablet TAKE ONE TABLET BY MOUTH ONCE 
does not use drugs.     Family History:  Reviewed. Reviewed. No family history of SCD.      Relevant and available labs, and cardiovascular diagnostics reviewed.   Reviewed.         I independently reviewed all cardiac tracing.    I independently reviewed relevant and available cardiac diagnostic tests ECG, CXR, Echo, Stress test, Device interrogation, Holter, CT scan.   *** Outside medical records via Care everywhere reviewed and summarized in H&P above.   *** Complex medical condition with multiple medical problems affecting prognosis and outcome of EP interventions    ***   - The patient is counseled to follow a low salt diet to assure blood pressure remains controlled for cardiovascular risk factor modification.   - The patient is counseled to avoid excess caffeine, and energy drinks as this may exacerbated ectopy and arrhythmia.   - The patient is counseled to get regular exercise 3-5 times per week to control cardiovascular risk factors.   - The patient is counseled to lose weigt to control cardiovascular risk factors.   - The patient is counseled to avoid tobacco use.       All questions and concerns were addressed to the patient/family. Alternatives to my treatment were discussed. I have discussed the above stated plan and the patient verbalized understanding and agreed with the plan.    ***   NOTE: This report was transcribed using voice recognition software. Every effort was made to ensure accuracy, however, inadvertent computerized transcription errors may be present.     Zaheer Newby MD, RS, [facility]   Office: [phone]  Fax: [phone]

## 2024-06-20 ENCOUNTER — OFFICE VISIT (OUTPATIENT)
Dept: CARDIOLOGY CLINIC | Age: 64
End: 2024-06-20
Payer: COMMERCIAL

## 2024-06-20 VITALS
DIASTOLIC BLOOD PRESSURE: 50 MMHG | HEIGHT: 68 IN | BODY MASS INDEX: 17.61 KG/M2 | HEART RATE: 79 BPM | OXYGEN SATURATION: 100 % | WEIGHT: 116.2 LBS | SYSTOLIC BLOOD PRESSURE: 100 MMHG

## 2024-06-20 DIAGNOSIS — I48.92 ATRIAL FLUTTER, UNSPECIFIED TYPE (HCC): ICD-10-CM

## 2024-06-20 DIAGNOSIS — I10 BENIGN ESSENTIAL HTN: Primary | ICD-10-CM

## 2024-06-20 DIAGNOSIS — I47.19 ATRIAL TACHYCARDIA (HCC): ICD-10-CM

## 2024-06-20 DIAGNOSIS — R00.1 BRADYCARDIA: ICD-10-CM

## 2024-06-20 PROCEDURE — 3078F DIAST BP <80 MM HG: CPT | Performed by: INTERNAL MEDICINE

## 2024-06-20 PROCEDURE — 3074F SYST BP LT 130 MM HG: CPT | Performed by: INTERNAL MEDICINE

## 2024-06-20 PROCEDURE — 99203 OFFICE O/P NEW LOW 30 MIN: CPT | Performed by: INTERNAL MEDICINE

## 2024-06-20 RX ORDER — ASPIRIN 81 MG/1
81 TABLET ORAL DAILY
COMMUNITY

## 2024-07-24 ENCOUNTER — PATIENT MESSAGE (OUTPATIENT)
Dept: CARDIOLOGY CLINIC | Age: 64
End: 2024-07-24

## 2024-07-24 ENCOUNTER — TELEPHONE (OUTPATIENT)
Dept: CARDIOLOGY CLINIC | Age: 64
End: 2024-07-24

## 2024-07-24 RX ORDER — METOPROLOL SUCCINATE 25 MG/1
25 TABLET, EXTENDED RELEASE ORAL DAILY
Qty: 90 TABLET | Refills: 3 | Status: SHIPPED | OUTPATIENT
Start: 2024-07-24

## 2024-07-24 NOTE — TELEPHONE ENCOUNTER
From: Lana Chapman  To: Dr. Zaheer Newby  Sent: 7/24/2024 8:35 AM EDT  Subject: Event monitor     May I please have the hard copy of my test results please. Thanks.

## 2024-08-01 LAB
CHOLEST SERPL-MCNC: 210 MG/DL (ref 0–199)
GLUCOSE SERPL-MCNC: 73 MG/DL (ref 70–99)
HDLC SERPL-MCNC: 101 MG/DL (ref 40–60)
LDLC SERPL CALC-MCNC: 95 MG/DL
TRIGL SERPL-MCNC: 72 MG/DL (ref 0–150)

## 2024-09-07 RX ORDER — HYDROCHLOROTHIAZIDE 25 MG/1
TABLET ORAL
Qty: 90 TABLET | Refills: 4 | Status: CANCELLED | OUTPATIENT
Start: 2024-09-07

## 2024-09-07 RX ORDER — ALENDRONATE SODIUM 70 MG/1
70 TABLET ORAL
Qty: 12 TABLET | Refills: 4 | Status: CANCELLED | OUTPATIENT
Start: 2024-09-07

## 2024-09-19 DIAGNOSIS — I47.19 ATRIAL TACHYCARDIA (HCC): Primary | ICD-10-CM

## 2024-09-20 DIAGNOSIS — I47.19 ATRIAL TACHYCARDIA (HCC): ICD-10-CM

## 2024-09-21 LAB
ALBUMIN SERPL-MCNC: 4.6 G/DL (ref 3.4–5)
ANION GAP SERPL CALCULATED.3IONS-SCNC: 12 MMOL/L (ref 3–16)
BUN SERPL-MCNC: 14 MG/DL (ref 7–20)
CALCIUM SERPL-MCNC: 10 MG/DL (ref 8.3–10.6)
CHLORIDE SERPL-SCNC: 98 MMOL/L (ref 99–110)
CO2 SERPL-SCNC: 29 MMOL/L (ref 21–32)
CREAT SERPL-MCNC: 0.7 MG/DL (ref 0.6–1.2)
GFR SERPLBLD CREATININE-BSD FMLA CKD-EPI: >90 ML/MIN/{1.73_M2}
GLUCOSE SERPL-MCNC: 81 MG/DL (ref 70–99)
PHOSPHATE SERPL-MCNC: 4.5 MG/DL (ref 2.5–4.9)
POTASSIUM SERPL-SCNC: 4.2 MMOL/L (ref 3.5–5.1)
SODIUM SERPL-SCNC: 139 MMOL/L (ref 136–145)

## 2024-10-07 ENCOUNTER — HOSPITAL ENCOUNTER (OUTPATIENT)
Dept: GENERAL RADIOLOGY | Age: 64
Discharge: HOME | End: 2024-10-07
Payer: COMMERCIAL

## 2024-10-07 ENCOUNTER — OFFICE VISIT (OUTPATIENT)
Dept: ENDOCRINOLOGY | Age: 64
End: 2024-10-07
Payer: COMMERCIAL

## 2024-10-07 VITALS — BODY MASS INDEX: 18.24 KG/M2 | HEIGHT: 67 IN | WEIGHT: 116.2 LBS

## 2024-10-07 DIAGNOSIS — M81.0 OSTEOPOROSIS, POSTMENOPAUSAL: ICD-10-CM

## 2024-10-07 DIAGNOSIS — Z51.81 MEDICATION MONITORING ENCOUNTER: ICD-10-CM

## 2024-10-07 DIAGNOSIS — M81.0 OSTEOPOROSIS, POSTMENOPAUSAL: Primary | ICD-10-CM

## 2024-10-07 DIAGNOSIS — R82.994 HYPERCALCIURIA: ICD-10-CM

## 2024-10-07 DIAGNOSIS — E87.6 HYPOKALEMIA: ICD-10-CM

## 2024-10-07 DIAGNOSIS — I10 ESSENTIAL HYPERTENSION: ICD-10-CM

## 2024-10-07 PROCEDURE — 77080 DXA BONE DENSITY AXIAL: CPT

## 2024-10-07 PROCEDURE — 77080 DXA BONE DENSITY AXIAL: CPT | Performed by: INTERNAL MEDICINE

## 2024-10-07 PROCEDURE — 1123F ACP DISCUSS/DSCN MKR DOCD: CPT | Performed by: INTERNAL MEDICINE

## 2024-10-07 PROCEDURE — 99214 OFFICE O/P EST MOD 30 MIN: CPT | Performed by: INTERNAL MEDICINE

## 2024-10-07 RX ORDER — ALBUTEROL SULFATE 90 UG/1
1-2 INHALANT RESPIRATORY (INHALATION)
COMMUNITY
Start: 2024-09-03

## 2024-10-07 RX ORDER — HYDROCHLOROTHIAZIDE 25 MG/1
TABLET ORAL
Qty: 90 TABLET | Refills: 4 | Status: SHIPPED | OUTPATIENT
Start: 2024-10-07

## 2024-10-07 RX ORDER — ALENDRONATE SODIUM 70 MG/1
70 TABLET ORAL
Qty: 12 TABLET | Refills: 4 | Status: SHIPPED | OUTPATIENT
Start: 2024-10-07

## 2024-10-07 NOTE — PROGRESS NOTES
steady without assistance.   NEUROLOGICAL. Able to rise from chair without using arms. No apparent focal motor or sensory deficit. Coordination appears normal.     BONE DENSITY.  Most recent done here using Hologic equipment.     T-scores  Initial study: 10/11/2012 L1-L4 -2.2 left fem. neck -2.6   Current study: 10/07/2024 L1-L4 -2.0 left fem. neck -2.7     The table below shows bone mineral density (grams/cm2), the appropriate measure for comparing serial scans. A significant “increase” or “decrease” is based on precision studies done at our center according to the ISCD protocol with a least significant change of 0.030 g/cm2.     PA spine Proximal Femur (left)   Date L1-L4 Fem. neck Trochanter Total hip   10/11/2012 0.800 0.560 0.561 ---   05/25/2018 0.766 0.524 0.526 0.697   08/03/2020 0.773 0.501 0.508 0.685   09/14/2021 0.830 0.512 0.513 0.700   10/10/2022 0.852 0.521 0.522 0.695   10/17/2023 0.828 0.563 0.512 0.702   10/07/2024 0.827 0.548 0.532 0.712     IMPRESSION:  BONE DENSITY IS LOW, CONSISTENT WITH OSTEOPOROSIS. SINCE THE LAST DXA, BMD DID NOT CHANGE SIGNIFICANTLY IN THE SPINE OR LEFT HIP.         Labs: 11/2012, CBC, TSH, CMP. 08/2015, Ca 9.7, Cr 0.7. 02/2016 . 03/2016 Ca 9.3 Cr 0.7.  03/2019 Ca 9.7 Cr 0.6. 03/2020 Ca 9.9 Cr 0.6. 03/2021 Ca 9.4 Cr 0.6. 03/2022 Ca 9.4 Cr 0.6.  04/2023 Ca 9.6 Cr 0.6. 03/2024 Ca 9.5 Cr 0.6.  X-rays viewed: DXA printouts reviewed.12/2013 abd CT.    ASSESSMENT.  Osteoporosis, bone density lower than desirable. BMD decreased substantially 7452-3334 but was stable/increased 0269-4738. Femoral neck BMD decreased 9961-0175. She is doing well with alendronate started 08/2020.    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 Potassium was low 03/2024, 4.2 09/2024.    PLANS. HCTZ 25 mg/d and alendronate 70 mg weekly. I gave her a list of high K foods.  Return with DXA in 1 year. Time today: 30-39 minutes.    Toni Beal MD, Director, [facility]

## 2024-11-06 ENCOUNTER — TELEPHONE (OUTPATIENT)
Dept: CARDIOLOGY CLINIC | Age: 64
End: 2024-11-06

## 2024-11-06 NOTE — TELEPHONE ENCOUNTER
Faxed received from [facility] requesting dates of service on or prior to 6-20-24 faxed to [phone].

## 2025-03-14 ENCOUNTER — HOSPITAL ENCOUNTER (OUTPATIENT)
Dept: WOMENS IMAGING | Age: 65
Discharge: HOME | End: 2025-03-14
Payer: COMMERCIAL

## 2025-03-14 ENCOUNTER — TELEPHONE (OUTPATIENT)
Dept: WOMENS IMAGING | Age: 65
End: 2025-03-14

## 2025-03-14 VITALS — WEIGHT: 117 LBS | BODY MASS INDEX: 17.73 KG/M2 | HEIGHT: 68 IN

## 2025-03-14 DIAGNOSIS — Z12.31 ENCOUNTER FOR SCREENING MAMMOGRAM FOR BREAST CANCER: ICD-10-CM

## 2025-03-14 PROCEDURE — 77063 BREAST TOMOSYNTHESIS BI: CPT

## 2025-03-25 ENCOUNTER — HOSPITAL ENCOUNTER (OUTPATIENT)
Dept: ULTRASOUND IMAGING | Age: 65
Discharge: HOME | End: 2025-03-25
Payer: COMMERCIAL

## 2025-03-25 ENCOUNTER — HOSPITAL ENCOUNTER (OUTPATIENT)
Dept: WOMENS IMAGING | Age: 65
Discharge: HOME | End: 2025-03-25
Payer: COMMERCIAL

## 2025-03-25 DIAGNOSIS — R92.8 ABNORMAL MAMMOGRAM: ICD-10-CM

## 2025-03-25 PROCEDURE — 76642 ULTRASOUND BREAST LIMITED: CPT

## 2025-03-25 PROCEDURE — 77065 DX MAMMO INCL CAD UNI: CPT

## 2025-04-19 ENCOUNTER — PATIENT MESSAGE (OUTPATIENT)
Dept: CARDIOLOGY CLINIC | Age: 65
End: 2025-04-19

## 2025-04-24 ENCOUNTER — HOSPITAL ENCOUNTER (OUTPATIENT)
Age: 65
Discharge: HOME | End: 2025-04-24
Payer: COMMERCIAL

## 2025-04-24 LAB
25(OH)D3 SERPL-MCNC: 54.1 NG/ML
ALBUMIN SERPL-MCNC: 4.5 G/DL (ref 3.4–5)
ALP SERPL-CCNC: 63 U/L (ref 40–129)
ALT SERPL-CCNC: 17 U/L (ref 10–40)
ANION GAP SERPL CALCULATED.3IONS-SCNC: 10 MMOL/L (ref 3–16)
AST SERPL-CCNC: 24 U/L (ref 15–37)
BACTERIA URNS QL MICRO: NORMAL /HPF
BASOPHILS # BLD: 0 K/UL (ref 0–0.2)
BASOPHILS NFR BLD: 1.1 %
BILIRUB DIRECT SERPL-MCNC: 0.2 MG/DL (ref 0–0.3)
BILIRUB INDIRECT SERPL-MCNC: 0.3 MG/DL (ref 0–1)
BILIRUB SERPL-MCNC: 0.5 MG/DL (ref 0–1)
BILIRUB UR QL STRIP.AUTO: NEGATIVE
BUN SERPL-MCNC: 14 MG/DL (ref 7–20)
CALCIUM SERPL-MCNC: 9.5 MG/DL (ref 8.3–10.6)
CHLORIDE SERPL-SCNC: 100 MMOL/L (ref 99–110)
CHOLEST SERPL-MCNC: 185 MG/DL (ref 0–199)
CLARITY UR: CLEAR
CO2 SERPL-SCNC: 30 MMOL/L (ref 21–32)
COLOR UR: YELLOW
CREAT SERPL-MCNC: 0.7 MG/DL (ref 0.6–1.2)
DEPRECATED RDW RBC AUTO: 13.2 % (ref 12.4–15.4)
EOSINOPHIL # BLD: 0.1 K/UL (ref 0–0.6)
EOSINOPHIL NFR BLD: 2.3 %
EPI CELLS #/AREA URNS AUTO: 2 /HPF (ref 0–5)
GFR SERPLBLD CREATININE-BSD FMLA CKD-EPI: >90 ML/MIN/{1.73_M2}
GLUCOSE SERPL-MCNC: 86 MG/DL (ref 70–99)
GLUCOSE UR STRIP.AUTO-MCNC: NEGATIVE MG/DL
HCT VFR BLD AUTO: 37.2 % (ref 36–48)
HDLC SERPL-MCNC: 88 MG/DL (ref 40–60)
HGB BLD-MCNC: 12.8 G/DL (ref 12–16)
HGB UR QL STRIP.AUTO: NEGATIVE
HYALINE CASTS #/AREA URNS AUTO: 0 /LPF (ref 0–8)
KETONES UR STRIP.AUTO-MCNC: NEGATIVE MG/DL
LDLC SERPL CALC-MCNC: 84 MG/DL
LEUKOCYTE ESTERASE UR QL STRIP.AUTO: ABNORMAL
LYMPHOCYTES # BLD: 0.8 K/UL (ref 1–5.1)
LYMPHOCYTES NFR BLD: 26.2 %
MCH RBC QN AUTO: 36.4 PG (ref 26–34)
MCHC RBC AUTO-ENTMCNC: 34.5 G/DL (ref 31–36)
MCV RBC AUTO: 105.3 FL (ref 80–100)
MONOCYTES # BLD: 0.3 K/UL (ref 0–1.3)
MONOCYTES NFR BLD: 9.2 %
NEUTROPHILS # BLD: 1.9 K/UL (ref 1.7–7.7)
NEUTROPHILS NFR BLD: 61.2 %
NITRITE UR QL STRIP.AUTO: NEGATIVE
PH UR STRIP.AUTO: 6 [PH] (ref 5–8)
PHOSPHATE SERPL-MCNC: 4.1 MG/DL (ref 2.5–4.9)
PLATELET # BLD AUTO: 226 K/UL (ref 135–450)
PMV BLD AUTO: 8.5 FL (ref 5–10.5)
POTASSIUM SERPL-SCNC: 3.7 MMOL/L (ref 3.5–5.1)
PROT SERPL-MCNC: 6.6 G/DL (ref 6.4–8.2)
PROT UR STRIP.AUTO-MCNC: NEGATIVE MG/DL
RBC # BLD AUTO: 3.53 M/UL (ref 4–5.2)
RBC CLUMPS #/AREA URNS AUTO: 1 /HPF (ref 0–4)
SODIUM SERPL-SCNC: 140 MMOL/L (ref 136–145)
SP GR UR STRIP.AUTO: 1.01 (ref 1–1.03)
TRIGL SERPL-MCNC: 64 MG/DL (ref 0–150)
TSH SERPL DL<=0.005 MIU/L-ACNC: 2 UIU/ML (ref 0.27–4.2)
UA COMPLETE W REFLEX CULTURE PNL UR: ABNORMAL
UA DIPSTICK W REFLEX MICRO PNL UR: YES
URN SPEC COLLECT METH UR: ABNORMAL
UROBILINOGEN UR STRIP-ACNC: 0.2 E.U./DL
VLDLC SERPL CALC-MCNC: 13 MG/DL
WBC # BLD AUTO: 3 K/UL (ref 4–11)
WBC #/AREA URNS AUTO: 5 /HPF (ref 0–5)

## 2025-04-24 PROCEDURE — 81001 URINALYSIS AUTO W/SCOPE: CPT

## 2025-04-24 PROCEDURE — 80076 HEPATIC FUNCTION PANEL: CPT

## 2025-04-24 PROCEDURE — 80069 RENAL FUNCTION PANEL: CPT

## 2025-04-24 PROCEDURE — 84443 ASSAY THYROID STIM HORMONE: CPT

## 2025-04-24 PROCEDURE — 82306 VITAMIN D 25 HYDROXY: CPT

## 2025-04-24 PROCEDURE — 85025 COMPLETE CBC W/AUTO DIFF WBC: CPT

## 2025-04-24 PROCEDURE — 80061 LIPID PANEL: CPT

## 2025-06-04 DIAGNOSIS — I47.19 ATRIAL TACHYCARDIA: Primary | ICD-10-CM

## 2025-06-04 DIAGNOSIS — E83.110 HEREDITARY HEMOCHROMATOSIS: ICD-10-CM

## 2025-06-17 RX ORDER — METOPROLOL SUCCINATE 25 MG/1
25 TABLET, EXTENDED RELEASE ORAL DAILY
Qty: 90 TABLET | Refills: 3 | Status: SHIPPED | OUTPATIENT
Start: 2025-06-17

## 2025-06-17 NOTE — TELEPHONE ENCOUNTER
Requested Prescriptions     Pending Prescriptions Disp Refills    metoprolol succinate (TOPROL XL) 25 MG extended release tablet [Pharmacy Med Name: METOPROLOL SUCCINATE ER 25MG TB24] 90 tablet 3     Sig: TAKE ONE TABLET BY MOUTH ONCE A DAY      LAST OV: 6/07/2024   NEXT OV:

## 2025-06-26 ENCOUNTER — TELEPHONE (OUTPATIENT)
Dept: CARDIOLOGY CLINIC | Age: 65
End: 2025-06-26

## 2025-07-02 ENCOUNTER — OFFICE VISIT (OUTPATIENT)
Age: 65
End: 2025-07-02
Payer: COMMERCIAL

## 2025-07-02 DIAGNOSIS — L82.1 SEBORRHEIC KERATOSES: Primary | ICD-10-CM

## 2025-07-02 DIAGNOSIS — D22.9 BENIGN NEVUS: ICD-10-CM

## 2025-07-02 PROCEDURE — 1123F ACP DISCUSS/DSCN MKR DOCD: CPT | Performed by: DERMATOLOGY

## 2025-07-02 PROCEDURE — 99213 OFFICE O/P EST LOW 20 MIN: CPT | Performed by: DERMATOLOGY

## 2025-07-02 NOTE — PROGRESS NOTES
[facility]  Steffany Vernon M.D.  [phone]       Lana Chapman  1960    64 y.o. female     Date of Visit: 7/2/2025    Chief Complaint:   Chief Complaint   Patient presents with    Skin Lesion        I was asked to see this patient by Dr. Beckford ref. provider found.    History of Present Illness:  1. TBSE    Multiple nevi. Patient has not noticed any new or changing pigmented lesions.   Stable in size, shape, color. Not itching, bleeding.     Seborrheic keratoses.  Increasing number of hyperkeratotic stuck on papules and plaques over the torso, scalp, right lower leg.  No discrete lesion itching, bleeding, becoming symptomatic.       7/25 cares for her 93-year-old mother who lives in a separate home        Skin History:    Recurrent onychomycosis left great toenail.  Has used Jublia 3 times previously with clearance and also with recurrence     Former patient of Kathleen Epps     Sister with a history of nonmelanoma skin cancer     No personal history of skin cancer     + Sunscreen.  Rare hats.  Minimal tanning bed history     Cardiology nurse practitioner at [employer]    Review of Systems:  Constitutional: Reports general sense of well-being       Past Medical History, Surgical History, Family History, Medications and Allergies reviewed.    Social History:   Social History     Socioeconomic History    Marital status:      Spouse name: Not on file    Number of children: Not on file    Years of education: Not on file    Highest education level: Not on file   Occupational History    Not on file   Tobacco Use    Smoking status: Never    Smokeless tobacco: Never   Vaping Use    Vaping status: Never Used   Substance and Sexual Activity    Alcohol use: Yes     Alcohol/week: 1.0 standard drink of alcohol     Types: 1 Glasses of wine per week     Comment: 4 x a week    Drug use: No    Sexual activity: Yes     Partners: Male   Other Topics Concern    Not on file   Social History Narrative    Not on

## 2025-07-23 ENCOUNTER — OFFICE VISIT (OUTPATIENT)
Dept: CARDIOLOGY CLINIC | Age: 65
End: 2025-07-23
Payer: COMMERCIAL

## 2025-07-23 VITALS
SYSTOLIC BLOOD PRESSURE: 104 MMHG | HEART RATE: 64 BPM | DIASTOLIC BLOOD PRESSURE: 68 MMHG | OXYGEN SATURATION: 99 % | WEIGHT: 119 LBS | BODY MASS INDEX: 18.04 KG/M2 | HEIGHT: 68 IN

## 2025-07-23 DIAGNOSIS — E83.110 HEREDITARY HEMOCHROMATOSIS: Primary | ICD-10-CM

## 2025-07-23 DIAGNOSIS — I10 BENIGN ESSENTIAL HTN: ICD-10-CM

## 2025-07-23 DIAGNOSIS — I10 ESSENTIAL HYPERTENSION: ICD-10-CM

## 2025-07-23 DIAGNOSIS — I48.92 ATRIAL FLUTTER, UNSPECIFIED TYPE (HCC): ICD-10-CM

## 2025-07-23 DIAGNOSIS — I47.19 ATRIAL TACHYCARDIA: ICD-10-CM

## 2025-07-23 DIAGNOSIS — R00.1 BRADYCARDIA: ICD-10-CM

## 2025-07-23 DIAGNOSIS — I31.39 PERICARDIAL EFFUSION: ICD-10-CM

## 2025-07-23 PROCEDURE — 3074F SYST BP LT 130 MM HG: CPT | Performed by: INTERNAL MEDICINE

## 2025-07-23 PROCEDURE — 3078F DIAST BP <80 MM HG: CPT | Performed by: INTERNAL MEDICINE

## 2025-07-23 PROCEDURE — 93000 ELECTROCARDIOGRAM COMPLETE: CPT | Performed by: INTERNAL MEDICINE

## 2025-07-23 PROCEDURE — 1123F ACP DISCUSS/DSCN MKR DOCD: CPT | Performed by: INTERNAL MEDICINE

## 2025-07-23 PROCEDURE — 99214 OFFICE O/P EST MOD 30 MIN: CPT | Performed by: INTERNAL MEDICINE

## 2025-07-23 NOTE — PROGRESS NOTES
[facility] [place]  7/23/25  Referring:     REASON FOR CONSULT/CHIEF COMPLAINT/HPI     Reason for visit/ Chief complaint  Follow up  pericarditis   HPI Lana Chapman is a 65 y.o. seen as a follow up to MRI.She had COVID  ( august 2022)treated with Paxlovid. Patient noticed heart rate running around 40 bpm while driving on Sept 18th. She has a history of HTN, Osteoporosis, hemochromatosis, reynaulds.   Does not want any calcium channel blocker due to fear of constipation.    She is a never smoker, drinks alcohol on occasion, no drug use history. Dad and sister have hemochromatosis as well. Mother has Afib, MR. No issues with pregnancy.  Younger sister has scleroderma, MS, hypothyroid ( symptoms of skin tightness)  She stopped metoprolol due to not having any symptoms of palpitations.  ( Was on 12.5 mg)      In march 2023 she was having \"PVC's\" at which time potassium was 3.3. Repeat was 3.8.  She has not had any since  7/24/24 metoprolol increased to 25 mg daily after monitor resulted.    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  She had a cardiac MRI, that did not show iron deposits but was positive for pericarditis. She started ibuprofen on her own    Today, she has not had any URI, infection fever or chills.  Very infrequent fluttering, so infrequent she has not been able to catch it on cardio mobile. No chest pain shortness of breath dizziness or syncope.     Patient is adherent with medications and is tolerating them well without side effects     HISTORY/ALLERGIES/ROS     MedHx:  has a past medical history of Hemochromatosis, Hypertension, and Osteoporosis.  SurgHx:  has a past surgical history that includes liver biopsy; Colonoscopy; bone marrow biopsy; and Colonoscopy (N/A, 12/21/2023).   SocHx:  reports that she has never smoked. She has never used smokeless tobacco. She reports current alcohol use of about 1.0 standard drink of alcohol per week. She reports that she does not use

## 2025-07-24 ENCOUNTER — HOSPITAL ENCOUNTER (OUTPATIENT)
Age: 65
Discharge: HOME | End: 2025-07-24
Payer: COMMERCIAL

## 2025-07-24 DIAGNOSIS — I31.39 PERICARDIAL EFFUSION: ICD-10-CM

## 2025-07-24 LAB
ALBUMIN SERPL-MCNC: 4.5 G/DL (ref 3.4–5)
ALBUMIN/GLOB SERPL: 1.8 {RATIO} (ref 1.1–2.2)
ALP SERPL-CCNC: 60 U/L (ref 40–129)
ALT SERPL-CCNC: 15 U/L (ref 10–40)
ANION GAP SERPL CALCULATED.3IONS-SCNC: 12 MMOL/L (ref 3–16)
AST SERPL-CCNC: 22 U/L (ref 15–37)
BILIRUB SERPL-MCNC: 0.4 MG/DL (ref 0–1)
BUN SERPL-MCNC: 14 MG/DL (ref 7–20)
CALCIUM SERPL-MCNC: 9.9 MG/DL (ref 8.3–10.6)
CHLORIDE SERPL-SCNC: 100 MMOL/L (ref 99–110)
CO2 SERPL-SCNC: 28 MMOL/L (ref 21–32)
CREAT SERPL-MCNC: 0.6 MG/DL (ref 0.6–1.2)
CRP SERPL-MCNC: <3 MG/L (ref 0–5.1)
ERYTHROCYTE [SEDIMENTATION RATE] IN BLOOD BY WESTERGREN METHOD: 2 MM/HR (ref 0–30)
GFR SERPLBLD CREATININE-BSD FMLA CKD-EPI: >90 ML/MIN/{1.73_M2}
GLUCOSE SERPL-MCNC: 83 MG/DL (ref 70–99)
NT-PROBNP SERPL-MCNC: 151 PG/ML (ref 0–124)
POTASSIUM SERPL-SCNC: 3.9 MMOL/L (ref 3.5–5.1)
PROT SERPL-MCNC: 7 G/DL (ref 6.4–8.2)
SODIUM SERPL-SCNC: 140 MMOL/L (ref 136–145)

## 2025-07-24 PROCEDURE — 36415 COLL VENOUS BLD VENIPUNCTURE: CPT

## 2025-07-24 PROCEDURE — 86140 C-REACTIVE PROTEIN: CPT

## 2025-07-24 PROCEDURE — 80053 COMPREHEN METABOLIC PANEL: CPT

## 2025-07-24 PROCEDURE — 83880 ASSAY OF NATRIURETIC PEPTIDE: CPT

## 2025-07-24 PROCEDURE — 85652 RBC SED RATE AUTOMATED: CPT

## 2025-07-25 ENCOUNTER — RESULTS FOLLOW-UP (OUTPATIENT)
Dept: CARDIOLOGY CLINIC | Age: 65
End: 2025-07-25

## 2025-07-25 RX ORDER — IBUPROFEN 400 MG/1
400 TABLET, FILM COATED ORAL DAILY
Qty: 30 TABLET | Refills: 3 | Status: SHIPPED | OUTPATIENT
Start: 2025-07-25

## 2025-07-25 RX ORDER — COLCHICINE 0.6 MG/1
0.6 TABLET ORAL 2 TIMES DAILY
Qty: 60 TABLET | Refills: 3 | Status: SHIPPED | OUTPATIENT
Start: 2025-07-25 | End: 2025-07-25 | Stop reason: SDUPTHER

## 2025-07-25 RX ORDER — COLCHICINE 0.6 MG/1
0.6 TABLET ORAL 2 TIMES DAILY
Qty: 60 TABLET | Refills: 3 | Status: SHIPPED | OUTPATIENT
Start: 2025-07-25

## 2025-07-25 NOTE — RESULT ENCOUNTER NOTE
Discussed with dkw, patient will start colchicine 0.6 mg bid, and ibuprofen 400 mg daily--notified patient

## 2025-08-29 RX ORDER — COLCHICINE 0.6 MG/1
0.6 TABLET ORAL 2 TIMES DAILY
Qty: 60 TABLET | Refills: 3 | Status: SHIPPED | OUTPATIENT
Start: 2025-08-29